# Patient Record
Sex: MALE | Race: WHITE | NOT HISPANIC OR LATINO | Employment: OTHER | URBAN - METROPOLITAN AREA
[De-identification: names, ages, dates, MRNs, and addresses within clinical notes are randomized per-mention and may not be internally consistent; named-entity substitution may affect disease eponyms.]

---

## 2019-10-16 ENCOUNTER — OFFICE VISIT (OUTPATIENT)
Dept: URGENT CARE | Facility: CLINIC | Age: 69
End: 2019-10-16
Payer: COMMERCIAL

## 2019-10-16 VITALS
DIASTOLIC BLOOD PRESSURE: 71 MMHG | SYSTOLIC BLOOD PRESSURE: 151 MMHG | WEIGHT: 251 LBS | OXYGEN SATURATION: 98 % | HEART RATE: 55 BPM | TEMPERATURE: 97 F | HEIGHT: 70 IN | BODY MASS INDEX: 35.93 KG/M2 | RESPIRATION RATE: 18 BRPM

## 2019-10-16 DIAGNOSIS — S60.562A INSECT BITE OF LEFT HAND, INITIAL ENCOUNTER: Primary | ICD-10-CM
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
## 2019-10-16 DIAGNOSIS — Z91.038 ALLERGIC REACTION TO INSECT BITE: ICD-10-CM

## 2019-10-16 PROCEDURE — 99213 OFFICE O/P EST LOW 20 MIN: CPT | Performed by: PHYSICIAN ASSISTANT

## 2019-10-16 RX ORDER — CHLORAL HYDRATE 500 MG
CAPSULE ORAL
COMMUNITY

## 2019-10-16 RX ORDER — AMINO ACIDS/MV,IRON,MIN
TABLET ORAL
COMMUNITY

## 2019-10-16 RX ORDER — LEVOTHYROXINE SODIUM 0.03 MG/1
TABLET ORAL
COMMUNITY
End: 2020-08-18

## 2019-10-16 RX ORDER — ATORVASTATIN CALCIUM 10 MG/1
TABLET, FILM COATED ORAL
COMMUNITY

## 2019-10-16 RX ORDER — NEBIVOLOL 5 MG/1
TABLET ORAL
COMMUNITY

## 2019-10-16 RX ORDER — VALSARTAN 160 MG/1
160 TABLET ORAL DAILY
COMMUNITY
End: 2020-08-18

## 2019-10-16 RX ORDER — METHYLPREDNISOLONE 4 MG/1
TABLET ORAL
Qty: 1 EACH | Refills: 0 | Status: SHIPPED | OUTPATIENT
Start: 2019-10-16 | End: 2020-08-18

## 2019-10-16 NOTE — PATIENT INSTRUCTIONS
Take steroid as directed with food and water  While on this medication do not take any NSAIDs including ibuprofen (Advil), naproxen (Aleve), etc   Avoid caffeinated beverages while taking this medication  Continue Benadryl taking as directed  Apply ice to your hand for 20-30 minutes at a time, as needed for swelling and discomfort  Rest and elevate your hand when possible  Avoid itching  Follow up with family doctor in 3-5 days  Proceed to the ER with any facial tongue or throat swelling, difficulty breathing, wheezing, nausea, vomiting, heart racing, or if symptoms worsen  General Allergic Reaction   WHAT YOU NEED TO KNOW:   An allergic reaction is your body's response to an allergen  Allergens include medicines, food, insect stings, animal dander, mold, latex, chemicals, and dust mites  Pollen from trees, grass, and weeds can also cause an allergic reaction  DISCHARGE INSTRUCTIONS:   Return to the emergency department if:   · You have a skin rash, hives, swelling, or itching that gets worse  · You have trouble breathing, shortness of breath, wheezing, or coughing  · Your throat tightens, or your lips or tongue swell  · You have trouble swallowing or speaking  · You have dizziness, lightheadedness, fainting, or confusion  · You have nausea, vomiting, diarrhea, or abdominal cramps  · You have chest pain or tightness  Contact your healthcare provider if:   · You have questions or concerns about your condition or care  Medicines:   · Medicines  may be given to relieve certain allergy symptoms such as itching, sneezing, and swelling  You may take them as a pill or use drops in your nose or eyes  Topical treatments may be given to put directly on your skin to help decrease itching or swelling  · Take your medicine as directed  Contact your healthcare provider if you think your medicine is not helping or if you have side effects  Tell him of her if you are allergic to any medicine  Keep a list of the medicines, vitamins, and herbs you take  Include the amounts, and when and why you take them  Bring the list or the pill bottles to follow-up visits  Carry your medicine list with you in case of an emergency  Follow up with your healthcare provider as directed:  Write down your questions so you remember to ask them during your visits  Self-care:   · Avoid the allergen  that you think may have caused your allergic reaction  · Use cold compresses  on your skin or eyes if they were affected by the allergic reaction  Cold compresses may help to soothe your skin or eyes  · Rinse your nasal passages  with a saline solution  Daily rinsing may help clear your nose of allergens  · Do not smoke  Your allergy symptoms may decrease if you are not around smoke  Nicotine and other chemicals in cigarettes and cigars can also cause lung damage  Ask your healthcare provider for information if you currently smoke and need help to quit  E-cigarettes or smokeless tobacco still contain nicotine  Talk to your healthcare provider before you use these products  © 2017 [facility] Information is for End User's use only and may not be sold, redistributed or otherwise used for commercial purposes  All illustrations and images included in CareNotes® are the copyrighted property of A D A M [employer] Inc  or [employer]  The above information is an  only  It is not intended as medical advice for individual conditions or treatments  Talk to your doctor, nurse or pharmacist before following any medical regimen to see if it is safe and effective for you

## 2019-10-16 NOTE — PROGRESS NOTES
[email or web address]        NAME: Carlos A Wills is a 81 y o  male  : 1950    MRN: [id number]  DATE: 2019  TIME: 5:47 PM    Assessment and Plan   Insect bite of left hand, initial encounter Terrell Chavez  XXXA]  1  Insect bite of left hand, initial encounter  methylPREDNISolone 4 MG tablet therapy pack   2  Allergic reaction to insect bite  methylPREDNISolone 4 MG tablet therapy pack     Patient Instructions     Take steroid as directed with food and water  While on this medication do not take any NSAIDs including ibuprofen (Advil), naproxen (Aleve), etc   Avoid caffeinated beverages while taking this medication  Continue Benadryl taking as directed  Apply ice to your hand for 20-30 minutes at a time, as needed for swelling and discomfort  Rest and elevate your hand when possible  Avoid itching  Follow up with family doctor in 3-5 days  Proceed to the ER with any facial tongue or throat swelling, difficulty breathing, wheezing, nausea, vomiting, heart racing, or if symptoms worsen  No signs of systemic response  S/sx of anaphylaxis reviewed  Notified that future reactions could worsen, and to take benadryl immediately with onset of these/to ER with anaphylaxis  The diagnosis, etiology, expected course of reaction, and treatment plan were reviewed  All questions answered  Precautions given  Patient verbalized understanding and agreement with the treatment plan  Chief Complaint     Chief Complaint   Patient presents with    Insect Bite     Bee sting L hand yesterday- experiencing itchiness/swelling  Taking benedryl for symptoms, no relief- last dose 1200 today  History of Present Illness   82 y/o male presenting with c/o bee sting of the left hand x one day  Pt states he was stung by a bee yesterday evening  States he was wearing work gloves at the time, when he felt a pinching and stinging of the mid dorsal hand   When he looked down he did not see any insects and no disruption of glove fabric  Upon removal noted some redness and swelling that persisted into the evening  This morning awoke with redness and swelling of the hand that has been increasing throughout the day  He has treated with Benadryl, one tablet x 1 dose yesterday evening and again today at noon  Notes a tingling and itching over the back of the hand, unchanged with Benadryl therapy, as well as tightness of the hand with making a fist  No numbness, tingling, weakness, throat swelling or closing, tongue or facial swelling, SOB, chest tightness, wheezing, N/V, CP, or palpitations  Denies prior reaction to insect stings but has been stung by bee's previously  Review of Systems   Review of Systems   Constitutional: Negative for appetite change, chills, diaphoresis, fatigue and fever  Respiratory: Negative for cough, chest tightness, shortness of breath and wheezing  Cardiovascular: Negative for chest pain and palpitations  Gastrointestinal: Negative for abdominal pain, diarrhea, nausea and vomiting  Musculoskeletal: Negative for arthralgias and myalgias  No other treatments tried  Skin: Positive for color change  Neurological: Negative for syncope, weakness, light-headedness, numbness and headaches           Current Medications       Current Outpatient Medications:     aspirin 81 MG tablet, Take by mouth, Disp: , Rfl:     atorvastatin (LIPITOR) 10 mg tablet, Take by mouth, Disp: , Rfl:     Bran Fiber 500 MG TABS, Take by mouth, Disp: , Rfl:     levothyroxine 25 mcg tablet, Take by mouth, Disp: , Rfl:     Multiple Vitamins-Minerals (OCUVITE EXTRA) TABS, Take by mouth, Disp: , Rfl:     nebivolol (BYSTOLIC) 5 mg tablet, Take by mouth, Disp: , Rfl:     Omega-3 Fatty Acids (FISH OIL) 1,000 mg, Take by mouth, Disp: , Rfl:     valsartan (DIOVAN) 160 mg tablet, Take 160 mg by mouth daily, Disp: , Rfl:     methylPREDNISolone 4 MG tablet therapy pack, Use as directed on package, Disp: 1 each, Rfl: 0    Current Allergies     Allergies as of 10/16/2019    (No Known Allergies)            The following portions of the patient's history were reviewed and updated as appropriate: allergies, current medications, past family history, past medical history, past social history, past surgical history and problem list      Past Medical History:   Diagnosis Date    Hypertension        History reviewed  No pertinent surgical history  History reviewed  No pertinent family history  Medications have been verified  Objective   /71 (BP Location: Right arm, Patient Position: Sitting, Cuff Size: Standard)   Pulse 55   Temp (!) 97 °F (36 1 °C) (Tympanic)   Resp 18   Ht 5' 10" (1 778 m)   Wt 114 kg (251 lb)   SpO2 98%   BMI 36 01 kg/m²        Physical Exam     Physical Exam   Constitutional: He is oriented to person, place, and time  Vital signs are normal  He appears well-developed and well-nourished  He is cooperative  He does not appear ill  No distress  HENT:   Head: Normocephalic and atraumatic  Mouth/Throat: Oropharynx is clear and moist and mucous membranes are normal  No oral lesions  No trismus in the jaw  No uvula swelling  Eyes: Conjunctivae and lids are normal  Right eye exhibits no chemosis, no discharge and no exudate  Left eye exhibits no chemosis, no discharge and no exudate  Right conjunctiva is not injected  Left conjunctiva is not injected  Neck: Trachea normal and phonation normal  Neck supple  No neck rigidity  No edema and no erythema present  Cardiovascular: Normal rate, regular rhythm and normal heart sounds  Exam reveals no gallop, no distant heart sounds and no friction rub  No murmur heard  Pulmonary/Chest: Effort normal and breath sounds normal  No stridor  No respiratory distress  He has no decreased breath sounds  He has no wheezes  He has no rhonchi  He has no rales  Abdominal: Soft  Bowel sounds are normal  He exhibits no distension and no mass   There is no tenderness  There is no rigidity, no rebound and no guarding  Neurological: He is alert and oriented to person, place, and time  He has normal strength  He is not disoriented  No cranial nerve deficit  He exhibits normal muscle tone  Coordination and gait normal    Skin: Skin is warm, dry and intact  He is not diaphoretic  No pallor  Edema and erythema overlying the dorsal left hand extending from the posterior wrist to the distal finger tips  There is a 1 centimeter area of deep erythema along the mid 2nd metacarpal  Area is firm, but NTTP  No disruption of skin integrity or FB  FROM of the wrist  LROM of digits secondary to swelling  Psychiatric: He has a normal mood and affect  His behavior is normal  Judgment and thought content normal    Nursing note and vitals reviewed

## 2020-01-16 ENCOUNTER — OFFICE VISIT (OUTPATIENT)
Dept: PULMONOLOGY | Facility: MEDICAL CENTER | Age: 70
End: 2020-01-16
Payer: COMMERCIAL

## 2020-01-16 VITALS
TEMPERATURE: 95.8 F | HEART RATE: 52 BPM | WEIGHT: 246 LBS | OXYGEN SATURATION: 97 % | BODY MASS INDEX: 35.22 KG/M2 | SYSTOLIC BLOOD PRESSURE: 104 MMHG | HEIGHT: 70 IN | DIASTOLIC BLOOD PRESSURE: 62 MMHG | RESPIRATION RATE: 12 BRPM

## 2020-01-16 DIAGNOSIS — G47.33 OSA (OBSTRUCTIVE SLEEP APNEA): Primary | ICD-10-CM

## 2020-01-16 DIAGNOSIS — E66.09 CLASS 2 OBESITY DUE TO EXCESS CALORIES WITHOUT SERIOUS COMORBIDITY WITH BODY MASS INDEX (BMI) OF 35.0 TO 35.9 IN ADULT: ICD-10-CM

## 2020-01-16 DIAGNOSIS — I10 BENIGN ESSENTIAL HYPERTENSION: ICD-10-CM

## 2020-01-16 PROBLEM — E66.9 CLASS 2 OBESITY IN ADULT: Status: ACTIVE | Noted: 2020-01-16

## 2020-01-16 PROBLEM — E66.812 CLASS 2 OBESITY IN ADULT: Status: ACTIVE | Noted: 2020-01-16

## 2020-01-16 PROCEDURE — 3078F DIAST BP <80 MM HG: CPT | Performed by: INTERNAL MEDICINE

## 2020-01-16 PROCEDURE — 99203 OFFICE O/P NEW LOW 30 MIN: CPT | Performed by: INTERNAL MEDICINE

## 2020-01-16 PROCEDURE — 3074F SYST BP LT 130 MM HG: CPT | Performed by: INTERNAL MEDICINE

## 2020-01-16 NOTE — ASSESSMENT & PLAN NOTE
Aguila Blanca has mild RYANNE diagnosed several years ago  His overall AHI on initial diagnostic study in 2016 was 5 6  He has been compliant and benefitting from using the CPAP which is set at 7 cm of water  He does use nasal mask interface  His weight has not changed since he was diagnosed in 2016  I did place order for new CPAP studies  He will continue on CPAP setting of 7  I did encourage him that if he could lose 30-40 lb he may have resolution of his RYANNE and not need CPAP therapy  I did place order to his [address] for new supplies    His CPAP does not have Wifi capability

## 2020-01-16 NOTE — PROGRESS NOTES
Assessment/Plan:       Problem List Items Addressed This Visit        Respiratory    RYANNE (obstructive sleep apnea) - Primary     Herb Bonner has mild RYANNE diagnosed several years ago  His overall AHI on initial diagnostic study in 2016 was 5 6  He has been compliant and benefitting from using the CPAP which is set at 7 cm of water  He does use nasal mask interface  His weight has not changed since he was diagnosed in 2016  I did place order for new CPAP studies  He will continue on CPAP setting of 7  I did encourage him that if he could lose 30-40 lb he may have resolution of his RYANNE and not need CPAP therapy  I did place order to his [address] for new supplies  His CPAP does not have Wifi capability           Relevant Orders    PAP DME Resupply/Reorder       Cardiovascular and Mediastinum    Benign essential hypertension     His hypertension has been controlled with his present medication  Other    Class 2 obesity in adult     He is 246 lb and he is weight this for the past few years  We did discuss weight loss  I did tell him that with a any significant weight loss of 30 lb or more he main have significant improvement is mild RYANNE not need CPAP                 Return in about 1 year (around 1/16/2021)  All questions are answered to the patient's satisfaction and understanding  He verbalizes understanding  He is encouraged to call with any further questions or concerns  Portions of the record may have been created with voice recognition software  Occasional wrong word or "sound a like" substitutions may have occurred due to the inherent limitations of voice recognition software  Read the chart carefully and recognize, using context, where substitutions have occurred  a    Electronically Signed by Lety Balderas DO    ______________________________________________________________________    Chief Complaint:   Chief Complaint   Patient presents with    Sleep Apnea     pt reports no issues with machine        Patient ID: Lonnie Resendez is a 78 y o  y o  male has a past medical history of Hypertension, Hypothyroidism, and Sleep apnea, obstructive  1/16/2020  Patient presents today for initial visit for RYANNE    HPI    Lonnie Resendez has history of mild obstructive sleep apnea diagnosed several years ago  I had seen patient past and last visit was October 14, 2016  He did have a diagnostic sleep study done in May of 2016 which showed overall AHI of 5 6 with no significant oxygen desaturation  He then had a CPAP titration study done on June 21, 2016 required CPAP pressure 7 to eliminate most of his obstructive events and reduced AHI to 0 4  Oxygen hector was 93% and average was 96% on the CPAP titration  His weight is the same as it was in 2016  He is 246 lb  He is using the CPAP every night and is not having any excessive daytime somnolence or nocturnal dyspnea  No morning headache  He does use a nasal mask interface and is comfortable with this  His medical supply company is [employer]  He denies any shortness of breath with his activities  He does have history of hypothyroidism and is on supplement and also has hypertension  He is scheduled for a follow-up visit with his cardiologist Dr Prakash Vargas  He has no known history of heart disease and will have routine cardiac testing done next week including likely stress test and echocardiogram       Review of Systems   Constitutional: Negative for activity change, appetite change and fatigue  HENT: Negative for congestion and rhinorrhea  Eyes: Negative for redness  Respiratory: Negative for cough and shortness of breath  Cardiovascular: Negative for chest pain and leg swelling  Gastrointestinal: Negative for abdominal pain  Endocrine: Negative for polydipsia and polyphagia  Genitourinary: Negative for hematuria  Musculoskeletal: Negative for myalgias  Neurological: Negative for dizziness     Psychiatric/Behavioral: Negative for decreased concentration  Social history: He reports that he has never smoked  He has never used smokeless tobacco  He reports that he drinks alcohol  He reports that he does not use drugs  Past surgical history:   Past Surgical History:   Procedure Laterality Date    APPENDECTOMY      INGUINAL HERNIA REPAIR Left     KNEE ARTHROSCOPY Bilateral     ROTATOR CUFF REPAIR Bilateral     TONSILLECTOMY       Family history:   Family History   Problem Relation Age of Onset    Diabetes Father     Hypertension Father          There is no immunization history on file for this patient  Current Outpatient Medications   Medication Sig Dispense Refill    aspirin 81 MG tablet Take by mouth      atorvastatin (LIPITOR) 10 mg tablet Take by mouth      Bran Fiber 500 MG TABS Take by mouth      levothyroxine 25 mcg tablet Take by mouth      Multiple Vitamins-Minerals (OCUVITE EXTRA) TABS Take by mouth      nebivolol (BYSTOLIC) 5 mg tablet Take by mouth      valsartan (DIOVAN) 160 mg tablet Take 160 mg by mouth daily      methylPREDNISolone 4 MG tablet therapy pack Use as directed on package (Patient not taking: Reported on 1/16/2020) 1 each 0    Omega-3 Fatty Acids (FISH OIL) 1,000 mg Take by mouth       No current facility-administered medications for this visit  Allergies: Patient has no known allergies  Objective:  Vitals:    01/16/20 0813   BP: 104/62   BP Location: Left arm   Patient Position: Sitting   Cuff Size: Standard   Pulse: (!) 52   Resp: 12   Temp: (!) 95 8 °F (35 4 °C)   TempSrc: Tympanic   SpO2: 97%   Weight: 112 kg (246 lb)   Height: 5' 10" (1 778 m)   Oxygen Therapy  SpO2: 97 %    Wt Readings from Last 3 Encounters:   01/16/20 112 kg (246 lb)   10/16/19 114 kg (251 lb)   10/14/16 112 kg (246 lb)     Body mass index is 35 3 kg/m²  Physical Exam   Constitutional: He is oriented to person, place, and time  He appears well-developed and well-nourished  No distress  Overweight    Alert no distress HENT:   Head: Normocephalic  Nose: Nose normal    Mouth/Throat: Oropharynx is clear and moist  No oropharyngeal exudate  Mallampati score is 3   Eyes: Pupils are equal, round, and reactive to light  Conjunctivae are normal    Neck: Neck supple  No JVD present  No tracheal deviation present  Cardiovascular: Normal rate, regular rhythm and normal heart sounds  Pulmonary/Chest: Effort normal    Lung sounds are clear   Abdominal: Soft  He exhibits no distension  There is no tenderness  There is no guarding  Musculoskeletal: He exhibits no edema  Lymphadenopathy:     He has no cervical adenopathy  Neurological: He is alert and oriented to person, place, and time  Skin: Skin is warm and dry  No rash noted  Psychiatric: He has a normal mood and affect   His behavior is normal  Thought content normal

## 2020-01-16 NOTE — ASSESSMENT & PLAN NOTE
He is 246 lb and he is weight this for the past few years  We did discuss weight loss    I did tell him that with a any significant weight loss of 30 lb or more he main have significant improvement is mild RYANNE not need CPAP

## 2020-08-18 ENCOUNTER — OFFICE VISIT (OUTPATIENT)
Dept: URGENT CARE | Facility: CLINIC | Age: 70
End: 2020-08-18
Payer: COMMERCIAL

## 2020-08-18 VITALS
HEIGHT: 70 IN | WEIGHT: 240.2 LBS | OXYGEN SATURATION: 100 % | TEMPERATURE: 97.2 F | DIASTOLIC BLOOD PRESSURE: 74 MMHG | HEART RATE: 52 BPM | SYSTOLIC BLOOD PRESSURE: 150 MMHG | RESPIRATION RATE: 18 BRPM | BODY MASS INDEX: 34.39 KG/M2

## 2020-08-18 DIAGNOSIS — S76.111A QUADRICEPS STRAIN, RIGHT, INITIAL ENCOUNTER: Primary | ICD-10-CM

## 2020-08-18 PROCEDURE — 99213 OFFICE O/P EST LOW 20 MIN: CPT | Performed by: PHYSICIAN ASSISTANT

## 2020-08-18 RX ORDER — LEVOTHYROXINE SODIUM 0.05 MG/1
TABLET ORAL DAILY
COMMUNITY
Start: 2020-06-10

## 2020-08-18 RX ORDER — LOSARTAN POTASSIUM 100 MG/1
TABLET ORAL DAILY
COMMUNITY
Start: 2020-07-28

## 2020-08-18 NOTE — PATIENT INSTRUCTIONS
Apply heat to your leg for 20-30 minutes at a time, 3-4 times daily  Perform stretching exercises 2-3 times daily as reviewed in office, following heat application  You may apply ice or gently massage your leg with a frozen water bottle following activity to reduce swelling and inflammation  Continue ibuprofen for discomfort relief  Follow up with your family doctor or an orthopedist in 3-5 days if symptoms persist  Proceed to the ER if symptoms worsen

## 2020-08-18 NOTE — PROGRESS NOTES
[facility]        NAME: Anthony Dickson is a 78 y o  male  : 1950    MRN: [id number]  DATE: 2020  TIME: 8:54 AM    Assessment and Plan   Quadriceps strain, right, initial encounter [P28 433C]  1  Quadriceps strain, right, initial encounter       Patient Instructions   Apply heat to your leg for 20-30 minutes at a time, 3-4 times daily  Perform stretching exercises 2-3 times daily as reviewed in office, following heat application  You may apply ice or gently massage your leg with a frozen water bottle following activity to reduce swelling and inflammation  Continue ibuprofen for discomfort relief  Follow up with your family doctor or an orthopedist in 3-5 days if symptoms persist  Proceed to the ER if symptoms worsen  Chief Complaint     Chief Complaint   Patient presents with    Leg Pain     Tripped over small gate Saturday evening - fell forward  Has continued occ  pain R thigh that occ  shoots to R hip  Used ice  Occ  limp  No bruising or swelling noted   Fall     History of Present Illness     53-year-old male presenting with c/o right leg pain x days  States he tripped over a 16 inch fence, falling forward onto hands and knees  Denies head or neck injury and denies LOC  Began with stiffness later in the day that gradually increased  States he feels a stiffness similar to that which he had previously from a football injury 50 years ago after being hit with helmet, "classified it as deep thigh bruise " Pain is described as a constant stiffness with intermittent tight pains  No bruising, redness, swelling, numbness, tingling, or weakness  Pain relieves some with walking  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  Has treated with Advil 800 mg tablet three times daily with relief of pain  Review of Systems   Review of Systems   Constitutional: Negative for chills, diaphoresis, fatigue and fever  Respiratory: Negative for cough      Cardiovascular: Negative for chest pain    Gastrointestinal: Negative for abdominal pain, nausea and vomiting  Musculoskeletal: Negative for arthralgias and myalgias  None other than as noted in HPI  Skin: Negative for color change, rash and wound  Neurological: Negative for light-headedness and headaches  Current Medicatio3 dans       Current Outpatient Medications:     aspirin 81 MG tablet, Take by mouth, Disp: , Rfl:     atorvastatin (LIPITOR) 10 mg tablet, Take by mouth, Disp: , Rfl:     Bran Fiber 500 MG TABS, Take by mouth, Disp: , Rfl:     levothyroxine 50 mcg tablet, daily, Disp: , Rfl:     losartan (COZAAR) 100 MG tablet, daily, Disp: , Rfl:     Multiple Vitamins-Minerals (OCUVITE EXTRA) TABS, Take by mouth, Disp: , Rfl:     nebivolol (BYSTOLIC) 5 mg tablet, Take by mouth, Disp: , Rfl:     Omega-3 Fatty Acids (FISH OIL) 1,000 mg, Take by mouth, Disp: , Rfl:     Current Allergies     Allergies as of 08/18/2020    (No Known Allergies)          The following portions of the patient's history were reviewed and updated as appropriate: allergies, current medications, past family history, past medical history, past social history, past surgical history and problem list      Past Medical History:   Diagnosis Date    Disease of thyroid gland     Hypertension     Hypothyroidism     Sleep apnea, obstructive        Past Surgical History:   Procedure Laterality Date    APPENDECTOMY      INGUINAL HERNIA REPAIR Left     KNEE ARTHROSCOPY Bilateral     ROTATOR CUFF REPAIR Bilateral     TONSILLECTOMY       Family History   Problem Relation Age of Onset    Diabetes Father     Hypertension Father      Medications have been verified  Objective   /74   Pulse (!) 52   Temp (!) 97 2 °F (36 2 °C)   Resp 18   Ht 5' 10" (1 778 m)   Wt 109 kg (240 lb 3 2 oz)   SpO2 100%   BMI 34 47 kg/m²      Physical Exam     Physical Exam  Vitals signs and nursing note reviewed     Constitutional:       General: He is not in acute distress  Appearance: He is well-developed  He is not ill-appearing or diaphoretic  HENT:      Head: Normocephalic and atraumatic  Eyes:      General: Lids are normal          Right eye: No discharge  Left eye: No discharge  Conjunctiva/sclera: Conjunctivae normal       Right eye: Right conjunctiva is not injected  No chemosis or exudate  Left eye: Left conjunctiva is not injected  No chemosis or exudate  Cardiovascular:      Rate and Rhythm: Normal rate and regular rhythm  Heart sounds: Normal heart sounds  Heart sounds not distant  Pulmonary:      Effort: Pulmonary effort is normal  No respiratory distress  Breath sounds: Normal breath sounds  No stridor  No decreased breath sounds, wheezing, rhonchi or rales  Musculoskeletal:      Right hip: Normal  He exhibits normal range of motion, normal strength, no tenderness, no bony tenderness, no swelling, no crepitus, no deformity and no laceration  Lumbar back: He exhibits tenderness (lateral right lumbar area)  He exhibits no bony tenderness, no swelling, no edema, no deformity, no laceration, no pain and no spasm  Right upper leg: He exhibits tenderness (right quadricep without spasm)  He exhibits no bony tenderness, no swelling, no edema, no deformity and no laceration  Comments: Pain elicited with right hip flexion  Skin:     General: Skin is warm and dry  Coloration: Skin is not pale  Findings: No erythema or rash  Neurological:      Mental Status: He is alert  He is not disoriented  Cranial Nerves: No cranial nerve deficit  Motor: No abnormal muscle tone  Coordination: Coordination normal       Gait: Gait normal    Psychiatric:         Behavior: Behavior normal  Behavior is cooperative  Thought Content:  Thought content normal          Judgment: Judgment normal

## 2020-09-02 ENCOUNTER — TELEPHONE (OUTPATIENT)
Dept: PULMONOLOGY | Facility: MEDICAL CENTER | Age: 70
End: 2020-09-02

## 2020-09-09 ENCOUNTER — OFFICE VISIT (OUTPATIENT)
Dept: PULMONOLOGY | Facility: MEDICAL CENTER | Age: 70
End: 2020-09-09
Payer: COMMERCIAL

## 2020-09-09 VITALS
WEIGHT: 234 LBS | HEIGHT: 70 IN | OXYGEN SATURATION: 99 % | HEART RATE: 50 BPM | BODY MASS INDEX: 33.5 KG/M2 | RESPIRATION RATE: 12 BRPM | SYSTOLIC BLOOD PRESSURE: 132 MMHG | TEMPERATURE: 97.8 F | DIASTOLIC BLOOD PRESSURE: 74 MMHG

## 2020-09-09 DIAGNOSIS — I10 BENIGN ESSENTIAL HYPERTENSION: ICD-10-CM

## 2020-09-09 DIAGNOSIS — G47.33 OSA (OBSTRUCTIVE SLEEP APNEA): Primary | ICD-10-CM

## 2020-09-09 PROCEDURE — 99213 OFFICE O/P EST LOW 20 MIN: CPT | Performed by: NURSE PRACTITIONER

## 2020-09-09 NOTE — PATIENT INSTRUCTIONS
Sleep Apnea   AMBULATORY CARE:   Sleep apnea  is also called obstructive sleep apnea  It is a condition that causes you to stop breathing for 10 seconds or more while you are sleeping  During normal sleep, your throat is kept open by muscles that let air pass through easily  Sleep apnea causes the muscles and tissues around your throat to relax and block air from passing through  Sleep apnea may happen many times while you are asleep  Common symptoms include the following:   · No signs of breathing for 10 seconds or more while you sleep    · Snoring loudly, snorting, gasping or choking while you sleep, and waking up suddenly because of these    · A hard time thinking, remembering things, or focusing on your tasks the following day    · Headache or nausea    · Bedwetting or waking up often during the night to urinate    · Feeling sleepy, slow, and tired during the day  Seek care immediately if:   · You have chest pain or trouble breathing  Contact your healthcare provider if:   · You feel tired or depressed  · You have trouble staying awake during the day  · You have trouble thinking clearly  · You have questions or concerns about your condition or care  Treatment for sleep apnea  includes using a continuous positive airway pressure (CPAP) machine to keep your airway open during sleep  A mask is placed over your nose and mouth, or just your nose  The mask is hooked to the CPAP machine that blows a gentle stream of air into the mask when you breathe  This helps keep your airway open so you can breathe more regularly  Extra oxygen may be given to you through the machine  You may be given a mouth device  It looks like a mouth guard or dental retainer and stops your tongue and mouth tissues from blocking your throat while you sleep  Surgery may be needed to remove extra tissues that block your mouth, throat, or nose  Manage sleep apnea:   · Do not smoke    Nicotine and other chemicals in cigarettes and cigars can cause lung damage  Ask your healthcare provider for information if you currently smoke and need help to quit  E-cigarettes or smokeless tobacco still contain nicotine  Talk to your healthcare provider before you use these products  · Do not drink alcohol or take sedative medicine before you go to sleep  Alcohol and sedatives can relax the muscles and tissues around your throat  This can block the airflow to your lungs  · Maintain a healthy weight  Excess tissue around your throat may restrict your breathing  Ask your healthcare provider for information if you need to lose weight  · Sleep on your side or use pillows designed to prevent sleep apnea  This prevents your tongue or other tissues from blocking your throat  You can also raise the head of your bed  Follow up with your healthcare provider as directed:  Write down your questions so you remember to ask them during your visits  © 2017 [employer] Information is for End User's use only and may not be sold, redistributed or otherwise used for commercial purposes  All illustrations and images included in CareNotes® are the copyrighted property of [employer]  or [employer]  The above information is an  only  It is not intended as medical advice for individual conditions or treatments  Talk to your doctor, nurse or pharmacist before following any medical regimen to see if it is safe and effective for you

## 2020-09-09 NOTE — PROGRESS NOTES
Assessment/Plan:     Problem List Items Addressed This Visit        Respiratory    RYANNE (obstructive sleep apnea) - Primary     Tonio Landers is a 78year old male with diagnosis of obstructive sleep apnea  Diagnosed in 2016 he had mild RYANNE  Apneic hypopnea index was 5 6  He continues to use an benefit from CPAP  His compliance data is reviewed for the last 30 days  He is 75% compliant with average usage time of 6 hours and 31 minutes his apneic hypopnea index is reduced to 0 7 his current CPAP pressure is 12 cm of water pressure  Philippe Contreras reports feeling refreshed in the morning  He uses the machine nearly every night  And is doing well              Cardiovascular and Mediastinum    Benign essential hypertension     Patient has a diagnosis of central hypertension  He is currently controlled  He sees his primary care doctor  He also follows with a cardiologist in [place], Dr Heather John  Return in about 1 year (around 9/9/2021)  All questions are answered to the patient's satisfaction and understanding  He verbalizes understanding  He is encouraged to call with any further questions or concerns  Portions of the record may have been created with voice recognition software  Occasional wrong word or "sound a like" substitutions may have occurred due to the inherent limitations of voice recognition software  Read the chart carefully and recognize, using context, where substitutions have occurred  Electronically Signed by HUGH Lockhart    ______________________________________________________________________    Chief Complaint:   Chief Complaint   Patient presents with    Sleep Apnea     Compiance       Patient ID: Tonio Landers is a 78 y o  y o  male has a past medical history of Disease of thyroid gland, Hypertension, Hypothyroidism, and Sleep apnea, obstructive  9/9/2020  Patient presents today for follow-up visit    HPI Tonio Landers is a 53-year-old male who has a diagnosis of obstructive sleep apnea he was diagnosed in 2016  Apneic hypopnea index was 5 6  He was last seen in the office January 16, 2020    Review of Systems   Constitutional: Negative  HENT: Negative  Eyes: Negative  Respiratory: Negative  Cardiovascular: Negative  Gastrointestinal: Negative  Endocrine: Negative  Genitourinary: Negative  Musculoskeletal: Negative  Allergic/Immunologic: Negative  Neurological: Negative  Hematological: Negative  Psychiatric/Behavioral: Negative  Smoking history: He reports that he has never smoked  He has never used smokeless tobacco     The following portions of the patient's history were reviewed and updated as appropriate: past family history, past medical history, past social history, past surgical history and problem list       There is no immunization history on file for this patient  Current Outpatient Medications   Medication Sig Dispense Refill    aspirin 81 MG tablet Take by mouth      atorvastatin (LIPITOR) 10 mg tablet Take by mouth      Bran Fiber 500 MG TABS Take by mouth      levothyroxine 50 mcg tablet daily      losartan (COZAAR) 100 MG tablet daily      Multiple Vitamins-Minerals (OCUVITE EXTRA) TABS Take by mouth      nebivolol (BYSTOLIC) 5 mg tablet Take by mouth      Omega-3 Fatty Acids (FISH OIL) 1,000 mg Take by mouth       No current facility-administered medications for this visit  Allergies: Patient has no known allergies  Objective:  Vitals:    09/09/20 0859   BP: 132/74   BP Location: Left arm   Patient Position: Sitting   Cuff Size: Standard   Pulse: (!) 50   Resp: 12   Temp: 97 8 °F (36 6 °C)   TempSrc: Temporal   SpO2: 99%   Weight: 106 kg (234 lb)   Height: 5' 10" (1 778 m)   Oxygen Therapy  SpO2: 99 %    Wt Readings from Last 3 Encounters:   09/09/20 106 kg (234 lb)   08/18/20 109 kg (240 lb 3 2 oz)   01/16/20 112 kg (246 lb)     Body mass index is 33 58 kg/m²  Physical Exam  Vitals signs reviewed     HENT: Head: Normocephalic and atraumatic  Nose: Nose normal       Mouth/Throat:      Mouth: Mucous membranes are dry  Eyes:      Pupils: Pupils are equal, round, and reactive to light  Neck:      Musculoskeletal: Normal range of motion  Cardiovascular:      Rate and Rhythm: Normal rate and regular rhythm  Pulses: Normal pulses  Pulmonary:      Effort: Pulmonary effort is normal       Breath sounds: Normal breath sounds  Abdominal:      General: Abdomen is flat  Musculoskeletal: Normal range of motion  Skin:     General: Skin is warm and dry  Capillary Refill: Capillary refill takes less than 2 seconds  Neurological:      General: No focal deficit present  Mental Status: He is oriented to person, place, and time  Psychiatric:         Mood and Affect: Mood normal          Behavior: Behavior normal          Lab Review:   No visits with results within 6 Month(s) from this visit  Latest known visit with results is:   No results found for any previous visit  Past Surgical History:   Procedure Laterality Date    APPENDECTOMY      INGUINAL HERNIA REPAIR Left     KNEE ARTHROSCOPY Bilateral     ROTATOR CUFF REPAIR Bilateral     TONSILLECTOMY          Family History   Problem Relation Age of Onset    Diabetes Father     Hypertension Father         Diagnostics:  I have personally reviewed pertinent reports  Office Spirometry Results:     ESS:    No results found

## 2020-09-09 NOTE — ASSESSMENT & PLAN NOTE
Brandon Hallman is a 78year old male with diagnosis of obstructive sleep apnea  Diagnosed in 2016 he had mild RYANNE  Apneic hypopnea index was 5 6  He continues to use an benefit from CPAP  His compliance data is reviewed for the last 30 days  He is 75% compliant with average usage time of 6 hours and 31 minutes his apneic hypopnea index is reduced to 0 7 his current CPAP pressure is 12 cm of water pressure  Dinorah Graham reports feeling refreshed in the morning  He uses the machine nearly every night    And is doing well

## 2020-09-09 NOTE — ASSESSMENT & PLAN NOTE
Patient has a diagnosis of central hypertension  He is currently controlled  He sees his primary care doctor  He also follows with a cardiologist in [facility], Dr Alfonso Gaspar

## 2020-09-22 DIAGNOSIS — G47.33 OSA (OBSTRUCTIVE SLEEP APNEA): Primary | ICD-10-CM

## 2021-03-01 ENCOUNTER — IMMUNIZATIONS (OUTPATIENT)
Dept: FAMILY MEDICINE CLINIC | Facility: HOSPITAL | Age: 71
End: 2021-03-01

## 2021-03-01 DIAGNOSIS — Z23 ENCOUNTER FOR IMMUNIZATION: Primary | ICD-10-CM

## 2021-03-01 PROCEDURE — 0001A SARS-COV-2 / COVID-19 MRNA VACCINE (PFIZER-BIONTECH) 30 MCG: CPT

## 2021-03-01 PROCEDURE — 91300 SARS-COV-2 / COVID-19 MRNA VACCINE (PFIZER-BIONTECH) 30 MCG: CPT

## 2021-03-22 ENCOUNTER — IMMUNIZATIONS (OUTPATIENT)
Dept: FAMILY MEDICINE CLINIC | Facility: HOSPITAL | Age: 71
End: 2021-03-22

## 2021-03-22 DIAGNOSIS — Z23 ENCOUNTER FOR IMMUNIZATION: Primary | ICD-10-CM

## 2021-03-22 PROCEDURE — 0002A SARS-COV-2 / COVID-19 MRNA VACCINE (PFIZER-BIONTECH) 30 MCG: CPT

## 2021-03-22 PROCEDURE — 91300 SARS-COV-2 / COVID-19 MRNA VACCINE (PFIZER-BIONTECH) 30 MCG: CPT

## 2021-09-08 ENCOUNTER — OFFICE VISIT (OUTPATIENT)
Dept: PULMONOLOGY | Facility: MEDICAL CENTER | Age: 71
End: 2021-09-08
Payer: COMMERCIAL

## 2021-09-08 VITALS
BODY MASS INDEX: 33.36 KG/M2 | HEIGHT: 70 IN | WEIGHT: 233 LBS | TEMPERATURE: 98.6 F | RESPIRATION RATE: 12 BRPM | DIASTOLIC BLOOD PRESSURE: 72 MMHG | HEART RATE: 60 BPM | OXYGEN SATURATION: 96 % | SYSTOLIC BLOOD PRESSURE: 128 MMHG

## 2021-09-08 DIAGNOSIS — E66.09 CLASS 1 OBESITY DUE TO EXCESS CALORIES WITHOUT SERIOUS COMORBIDITY WITH BODY MASS INDEX (BMI) OF 33.0 TO 33.9 IN ADULT: ICD-10-CM

## 2021-09-08 DIAGNOSIS — K04.7 TOOTH INFECTION: ICD-10-CM

## 2021-09-08 DIAGNOSIS — G47.33 OSA (OBSTRUCTIVE SLEEP APNEA): Primary | ICD-10-CM

## 2021-09-08 DIAGNOSIS — I10 BENIGN ESSENTIAL HYPERTENSION: ICD-10-CM

## 2021-09-08 PROCEDURE — 99214 OFFICE O/P EST MOD 30 MIN: CPT | Performed by: INTERNAL MEDICINE

## 2021-09-08 RX ORDER — OXYCODONE AND ACETAMINOPHEN 10; 325 MG/1; MG/1
1 TABLET ORAL EVERY 6 HOURS PRN
COMMUNITY
Start: 2021-09-02

## 2021-09-08 RX ORDER — METHYLCELLULOSE 500 MG/1
TABLET ORAL
COMMUNITY

## 2021-09-08 RX ORDER — AMOXICILLIN AND CLAVULANATE POTASSIUM 875; 125 MG/1; MG/1
1 TABLET, FILM COATED ORAL EVERY 12 HOURS
COMMUNITY
Start: 2021-09-06

## 2021-09-08 RX ORDER — CLINDAMYCIN HYDROCHLORIDE 150 MG/1
150 CAPSULE ORAL EVERY 6 HOURS
COMMUNITY
Start: 2021-09-06

## 2021-09-08 NOTE — PROGRESS NOTES
Assessment/Plan        Problem List Items Addressed This Visit        Digestive    Tooth infection     He currently has infection of left lower molar which is causing him pain  He is on antibiotic therapy by status he was seen oral surgeon  Plan was to treat infection antibiotic therapy and then have root canal when the infection has subsided  Because of this pain he is not able to uses CPAP therapy but says will resume once the pain improves         Relevant Medications    amoxicillin-clavulanate (AUGMENTIN) 875-125 mg per tablet       Respiratory    RYANNE (obstructive sleep apnea) - Primary     Initial in-lab diagnostic sleep study done 05/19/2016 when he weighed 240 lb showed mild RYANNE  Overall AHI was 5 6 and this increased to 23 4 during REM sleep  Average O2 saturation was 94% with hector of 90%    He is due for new CPAP machine and is present 1 is on recall list   Initial machine was issued 08/24/2016  DME is [facility]    He has been compliant and benefiting from using CPAP    I will place order for auto CPAP machine set at 7-10 cm water  His current machine is set at 7 cm water  He does use nasal mask interface to goes underneath the nose  He will contact us after he gets his new machine         Relevant Orders    CPAP Auto New DME       Cardiovascular and Mediastinum    Benign essential hypertension     He does take Cozaar 100 mg daily and Bystolic 5 mg daily  His blood pressure has been controlled and blood pressure today is normal             Other    Class 1 obesity due to excess calories without serious comorbidity with body mass index (BMI) of 33 0 to 33 9 in adult     He is overweight  He has lost some weight since his initial diagnostic sleep study when he weighed 240 lb May of 2016  Today he weighs 233 lb  I did encourage him to try to exercise, diet and lose weight    If he can lose significant weight he may not even need CPAP therapy  Initial diagnostic study showed mild RYANNE  Follow-up of RYANNE      HPI    Carnella Hollow for follow-up of obstructive sleep apnea  He has been having problems with left infected molar  This is a posterior molar and  has been having lot of pain  Because of this it has been  difficult for him open his mouth he has not use his CPAP last few days  Also has some swelling on the left side of his face  Previously was using on regular basis and doing well with it  Not having excessive daytime somnolence  He is on CPAP set at 7 cm water and uses a nasal mask that goes underneath the nose  He is satisfied with his interface  He has an auto [employer] machine issued 08/24/2016  He did see Dr Edy Barone for his infected left tooth  He is on antibiotic therapy and pain medication and ultimately plan was to have root canal done once the infection improves  Still having significant pain  He did have right shoulder surgery in January this year by Dr Efraín Broussard and did have a staph infection afterwards requiring antibiotic therapy for 2 weeks     DME for his CPAP is [address]    Does have history of hypertension    Also with history of hyperlipidemia and hypothyroidism    Past Medical History:   Diagnosis Date    Disease of thyroid gland     Hypertension     Hypothyroidism     Sleep apnea, obstructive        Past Surgical History:   Procedure Laterality Date    APPENDECTOMY      INGUINAL HERNIA REPAIR Left     KNEE ARTHROSCOPY Bilateral     ROTATOR CUFF REPAIR Bilateral     TONSILLECTOMY           Current Outpatient Medications:     amoxicillin-clavulanate (AUGMENTIN) 875-125 mg per tablet, 1 tablet every 12 (twelve) hours, Disp: , Rfl:     aspirin 81 MG tablet, Take by mouth, Disp: , Rfl:     atorvastatin (LIPITOR) 10 mg tablet, Take by mouth, Disp: , Rfl:     Bran Fiber 500 MG TABS, Take by mouth, Disp: , Rfl:     Cholecalciferol 25 MCG (1000 UT) tablet, 125 MCG 1 TABLET DAILY, Disp: , Rfl:     levothyroxine 50 mcg tablet, daily, Disp: , Rfl:     losartan (COZAAR) 100 MG tablet, daily, Disp: , Rfl:     methylcellulose (Citrucel) 500 mg tablet, Take 2 tablets daily, Disp: , Rfl:     Multiple Vitamins-Minerals (OCUVITE EXTRA) TABS, Take by mouth, Disp: , Rfl:     nebivolol (BYSTOLIC) 5 mg tablet, Take by mouth, Disp: , Rfl:     Omega-3 Fatty Acids (FISH OIL) 1,000 mg, Take by mouth, Disp: , Rfl:     oxyCODONE-acetaminophen (PERCOCET)  mg per tablet, Take 1 tablet by mouth every 6 (six) hours as needed, Disp: , Rfl:     clindamycin (CLEOCIN) 150 mg capsule, Take 150 mg by mouth every 6 (six) hours (Patient not taking: Reported on 9/8/2021), Disp: , Rfl:     Allergies   Allergen Reactions    Sulfamethoxazole-Trimethoprim Swelling       Social History     Tobacco Use    Smoking status: Never Smoker    Smokeless tobacco: Never Used   Substance Use Topics    Alcohol use: Yes     Comment: social         Family History   Problem Relation Age of Onset    Diabetes Father     Hypertension Father        Review of Systems   Constitutional: Negative for chills, fever and unexpected weight change  HENT: Negative for congestion, rhinorrhea and sore throat  Has left to pain swelling of face secondary to this  Tooth is infected   Eyes: Negative for discharge and redness  Respiratory: Negative for shortness of breath  Cardiovascular: Negative for chest pain, palpitations and leg swelling  Gastrointestinal: Negative for abdominal distention, abdominal pain and nausea  Endocrine: Negative for polydipsia and polyphagia  Genitourinary: Negative for dysuria  Musculoskeletal: Negative for joint swelling and myalgias  Skin: Negative for rash  Neurological: Negative for light-headedness  Psychiatric/Behavioral: Negative for decreased concentration  Vitals:    09/08/21 0804   BP: 128/72   Pulse: 60   Resp: 12   Temp: 98 6 °F (37 °C)   SpO2: 96%           Physical Exam  Vitals reviewed     Constitutional: General: He is not in acute distress  Appearance: Normal appearance  He is well-developed  HENT:      Head: Normocephalic  Nose: Nose normal       Mouth/Throat:      Mouth: Mucous membranes are moist       Pharynx: Oropharynx is clear  No oropharyngeal exudate  Eyes:      Conjunctiva/sclera: Conjunctivae normal       Pupils: Pupils are equal, round, and reactive to light  Cardiovascular:      Rate and Rhythm: Normal rate and regular rhythm  Heart sounds: Normal heart sounds  Pulmonary:      Effort: Pulmonary effort is normal       Comments: Lung sounds are clear  No wheezes, crackles or rhonchi  Abdominal:      General: There is no distension  Palpations: Abdomen is soft  Tenderness: There is no abdominal tenderness  Musculoskeletal:      Cervical back: Neck supple  Comments: No edema, cyanosis or clubbing   Lymphadenopathy:      Cervical: No cervical adenopathy  Skin:     General: Skin is warm and dry  Neurological:      Mental Status: He is alert and oriented to person, place, and time  Psychiatric:         Mood and Affect: Mood normal          Behavior: Behavior normal          Thought Content:  Thought content normal

## 2021-09-25 PROBLEM — K04.7 TOOTH INFECTION: Status: ACTIVE | Noted: 2021-09-25

## 2021-09-25 PROBLEM — E66.09 CLASS 1 OBESITY DUE TO EXCESS CALORIES WITHOUT SERIOUS COMORBIDITY WITH BODY MASS INDEX (BMI) OF 33.0 TO 33.9 IN ADULT: Status: ACTIVE | Noted: 2020-01-16

## 2021-09-25 PROBLEM — E66.811 CLASS 1 OBESITY DUE TO EXCESS CALORIES WITHOUT SERIOUS COMORBIDITY WITH BODY MASS INDEX (BMI) OF 33.0 TO 33.9 IN ADULT: Status: ACTIVE | Noted: 2020-01-16

## 2021-09-25 NOTE — ASSESSMENT & PLAN NOTE
He does take Cozaar 100 mg daily and Bystolic 5 mg daily    His blood pressure has been controlled and blood pressure today is normal

## 2021-09-25 NOTE — ASSESSMENT & PLAN NOTE
Initial in-lab diagnostic sleep study done 05/19/2016 when he weighed 240 lb showed mild RYANNE  Overall AHI was 5 6 and this increased to 23 4 during REM sleep  Average O2 saturation was 94% with hector of 90%    He is due for new CPAP machine and is present 1 is on recall list   Initial machine was issued 08/24/2016  DME is [facility]    He has been compliant and benefiting from using CPAP    I will place order for auto CPAP machine set at 7-10 cm water  His current machine is set at 7 cm water  He does use nasal mask interface to goes underneath the nose      He will contact us after he gets his new machine

## 2021-09-25 NOTE — ASSESSMENT & PLAN NOTE
He currently has infection of left lower molar which is causing him pain  He is on antibiotic therapy by status he was seen oral surgeon  Plan was to treat infection antibiotic therapy and then have root canal when the infection has subsided    Because of this pain he is not able to uses CPAP therapy but says will resume once the pain improves

## 2021-09-25 NOTE — ASSESSMENT & PLAN NOTE
He is overweight  He has lost some weight since his initial diagnostic sleep study when he weighed 240 lb May of 2016  Today he weighs 233 lb  I did encourage him to try to exercise, diet and lose weight    If he can lose significant weight he may not even need CPAP therapy  Initial diagnostic study showed mild RYANNE

## 2021-10-02 ENCOUNTER — IMMUNIZATIONS (OUTPATIENT)
Dept: FAMILY MEDICINE CLINIC | Facility: HOSPITAL | Age: 71
End: 2021-10-02

## 2021-10-02 DIAGNOSIS — Z23 ENCOUNTER FOR IMMUNIZATION: Primary | ICD-10-CM

## 2021-10-02 PROCEDURE — 0001A SARS-COV-2 / COVID-19 MRNA VACCINE (PFIZER-BIONTECH) 30 MCG: CPT

## 2021-10-02 PROCEDURE — 91300 SARS-COV-2 / COVID-19 MRNA VACCINE (PFIZER-BIONTECH) 30 MCG: CPT

## 2021-10-13 ENCOUNTER — TELEPHONE (OUTPATIENT)
Dept: PULMONOLOGY | Facility: CLINIC | Age: 71
End: 2021-10-13

## 2021-12-10 ENCOUNTER — VBI (OUTPATIENT)
Dept: ADMINISTRATIVE | Facility: OTHER | Age: 71
End: 2021-12-10

## 2023-07-10 ENCOUNTER — HOSPITAL ENCOUNTER (OUTPATIENT)
Dept: RADIOLOGY | Facility: HOSPITAL | Age: 73
Discharge: HOME | End: 2023-07-10
Attending: INTERNAL MEDICINE
Payer: COMMERCIAL

## 2023-07-10 DIAGNOSIS — R42 DIZZINESS AND GIDDINESS: ICD-10-CM

## 2023-07-10 DIAGNOSIS — J32.4 CHRONIC PANSINUSITIS: ICD-10-CM

## 2023-07-10 PROCEDURE — 70486 CT MAXILLOFACIAL W/O DYE: CPT

## 2023-07-10 PROCEDURE — G1004 CDSM NDSC: HCPCS

## 2023-07-10 PROCEDURE — 70450 CT HEAD/BRAIN W/O DYE: CPT

## 2024-02-21 ENCOUNTER — OFFICE VISIT (OUTPATIENT)
Dept: OBGYN CLINIC | Facility: CLINIC | Age: 74
End: 2024-02-21
Payer: COMMERCIAL

## 2024-02-21 ENCOUNTER — APPOINTMENT (OUTPATIENT)
Dept: RADIOLOGY | Facility: CLINIC | Age: 74
End: 2024-02-21
Payer: COMMERCIAL

## 2024-02-21 VITALS
WEIGHT: 239.8 LBS | HEIGHT: 69 IN | BODY MASS INDEX: 35.52 KG/M2 | SYSTOLIC BLOOD PRESSURE: 138 MMHG | DIASTOLIC BLOOD PRESSURE: 73 MMHG | HEART RATE: 61 BPM

## 2024-02-21 DIAGNOSIS — M70.62 TROCHANTERIC BURSITIS OF LEFT HIP: Primary | ICD-10-CM

## 2024-02-21 DIAGNOSIS — M25.552 LEFT HIP PAIN: ICD-10-CM

## 2024-02-21 DIAGNOSIS — M16.12 PRIMARY OSTEOARTHRITIS OF ONE HIP, LEFT: ICD-10-CM

## 2024-02-21 DIAGNOSIS — M76.32 IT BAND SYNDROME, LEFT: ICD-10-CM

## 2024-02-21 PROCEDURE — 73502 X-RAY EXAM HIP UNI 2-3 VIEWS: CPT

## 2024-02-21 PROCEDURE — 99204 OFFICE O/P NEW MOD 45 MIN: CPT | Performed by: ORTHOPAEDIC SURGERY

## 2024-02-21 NOTE — PROGRESS NOTES
Assessment/Plan:  1. Trochanteric bursitis of left hip  Ambulatory Referral to Physical Therapy      2. It band syndrome, left  Ambulatory Referral to Physical Therapy      3. Left hip pain  XR hip/pelv 2-3 vws left if performed    Ambulatory Referral to Physical Therapy      4. Primary osteoarthritis of one hip, left  Ambulatory Referral to Physical Therapy        Scribe Attestation      I,:  Chris Canales PA-C am acting as a scribe while in the presence of the attending physician.:       I,:  Aubrey Patel, DO personally performed the services described in this documentation    as scribed in my presence.:           Pan is a very pleasant 74-year-old gentleman presenting today for initial evaluation of left hip pain.  After reviewing his images, history, and physical exam, he does have mild age-appropriate osteoarthritis of the left hip.  However, this does not appear to be his major pain generator, as we were unable to reproduce any intra-articular symptoms on exam.  He is symptomatic of trochanteric bursitis and IT band syndrome.  We discussed this with him here today and treatment options.  We agreed to begin with conservative treatment in the form of physical therapy to stretch the IT band.  Additionally, we have recommended that he use Voltaren gel anywhere between 2-4 times per day on the affected area.  He may take Tylenol as well as needed.  If this does not resolve his symptoms, he can return in 6 to 8 weeks for reevaluation and consideration of a cortisone injection.  He expressed understanding all of his questions were addressed today    Subjective: Initial evaluation left hip pain    Patient ID: Pan Gaming is a 74 y.o. male presenting today for evaluation of his left hip.  He denies any history of injury or surgery to the hip.  He denies any history of dysplasia as a child.  Over the past few months, he has noted increasing discomfort in the lateral and anterior left hip.  It bothers  him while he is sleeping and first thing in the morning.  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  He has not tried any specific interventions.  He does note some difficulty putting on his shoes and socks in the morning, but does not have difficulty getting in and out of the car or going up and down stairs.  The pain is achy and is up to 10 out of 10 at its worst, but currently is 4-5 out of 10.  He does have a history of lumbar spine issues for which he sees a chiropractor.  He does not use any ambulatory assistive devices, is independent with all activities of daily living and enjoyment, and denies any paresthesias    Review of Systems   Constitutional: Negative.    HENT: Negative.     Eyes: Negative.    Respiratory: Negative.     Cardiovascular: Negative.    Gastrointestinal: Negative.    Endocrine: Negative.    Genitourinary: Negative.    Musculoskeletal:  Positive for arthralgias, joint swelling and myalgias.   Skin: Negative.    Allergic/Immunologic: Negative.    Neurological: Negative.    Hematological: Negative.    Psychiatric/Behavioral: Negative.           Past Medical History:   Diagnosis Date    Disease of thyroid gland     Dizziness     Ear problems     Hypertension     Hypothyroidism     Sleep apnea, obstructive     Sleep difficulties        Past Surgical History:   Procedure Laterality Date    APPENDECTOMY      INGUINAL HERNIA REPAIR Left     KNEE ARTHROSCOPY Bilateral     ROTATOR CUFF REPAIR Bilateral     TONSILLECTOMY         Family History   Problem Relation Age of Onset    Diabetes Father     Hypertension Father        Social History     Occupational History    Not on file   Tobacco Use    Smoking status: Never    Smokeless tobacco: Never   Substance and Sexual Activity    Alcohol use: Yes     Comment: social    Drug use: Never    Sexual activity: Not on file         Current Outpatient Medications:     aspirin 81 MG  tablet, Take by mouth, Disp: , Rfl:     atorvastatin (LIPITOR) 10 mg tablet, Take by mouth, Disp: , Rfl:     azelastine (ASTELIN) 0.1 % nasal spray, 1 spray into each nostril daily, Disp: 30 mL, Rfl: 6    betamethasone valerate (VALISONE) 0.1 % cream, Apply topically daily, Disp: 15 g, Rfl: 2    Bran Fiber 500 MG TABS, Take by mouth, Disp: , Rfl:     Cholecalciferol 25 MCG (1000 UT) tablet, 125 MCG 1 TABLET DAILY, Disp: , Rfl:     levothyroxine 75 mcg tablet, Take 75 mcg by mouth every morning, Disp: , Rfl:     losartan (COZAAR) 100 MG tablet, daily, Disp: , Rfl:     losartan-hydrochlorothiazide (HYZAAR) 100-12.5 MG per tablet, , Disp: , Rfl:     meclizine (ANTIVERT) 25 mg tablet, TAKE 1 TABLET (25 MG TOTAL) BY MOUTH 2 TIMES A DAY AS NEEDED FOR DIZZINESS, Disp: , Rfl:     metFORMIN (GLUCOPHAGE) 500 mg tablet, Take 500 mg by mouth daily, Disp: , Rfl:     methylcellulose (Citrucel) 500 mg tablet, Take 2 tablets daily, Disp: , Rfl:     Multiple Vitamins-Minerals (OCUVITE EXTRA) TABS, Take by mouth, Disp: , Rfl:     nebivolol (BYSTOLIC) 5 mg tablet, Take by mouth, Disp: , Rfl:     Omega-3 Fatty Acids (FISH OIL) 1,000 mg, Take by mouth, Disp: , Rfl:     Allergies   Allergen Reactions    Sulfamethoxazole-Trimethoprim Swelling       Objective:  Vitals:    02/21/24 0816   BP: 138/73   Pulse: 61       Body mass index is 35.41 kg/m².    Left Hip Exam     Tenderness   The patient is experiencing tenderness in the lateral and greater trochanter (IT band).    Range of Motion   Abduction:  35 normal   Adduction:  20 normal   Extension:  0 normal   Flexion:  120 normal   External rotation:  50 normal   Internal rotation: 20 normal     Muscle Strength   Abduction: 5/5   Adduction: 5/5   Flexion: 5/5     Tests   ALEXANDRA: negative  Miguel A: positive    Other   Erythema: absent  Scars: absent  Sensation: normal  Pulse: present    Comments:  Overhanging abdominal pannus  Negative ALEXANDRA Araujo FADIR TTP greater trochanteric bursa and  IT band  Thigh and calf soft and nontender  +SILT L2-S1  Ambulates with normal symmetrical gait without assistive device            Physical Exam  Vitals and nursing note reviewed.   Constitutional:       Appearance: Normal appearance. He is well-developed.      Comments: Body mass index is 35.41 kg/m².   HENT:      Head: Normocephalic and atraumatic.      Right Ear: External ear normal.      Left Ear: External ear normal.   Eyes:      Extraocular Movements: Extraocular movements intact.      Conjunctiva/sclera: Conjunctivae normal.   Cardiovascular:      Rate and Rhythm: Normal rate.      Pulses: Normal pulses.   Pulmonary:      Effort: Pulmonary effort is normal.   Abdominal:      Palpations: Abdomen is soft.   Musculoskeletal:      Cervical back: Normal range of motion.      Comments: See ortho exam   Skin:     General: Skin is warm and dry.   Neurological:      General: No focal deficit present.      Mental Status: He is alert and oriented to person, place, and time. Mental status is at baseline.   Psychiatric:         Mood and Affect: Mood normal.         Behavior: Behavior normal.         Thought Content: Thought content normal.         Judgment: Judgment normal.         I have personally reviewed pertinent films in PACS the x-rays taken today of his left hip.  There is mild joint space narrowing and early marginal osteophytosis, but no significant sclerosis or subchondral cyst formation.  There is no evidence of avascular necrosis, fracture, or lytic or blastic lesion.    This document was created using speech voice recognition software.   Grammatical errors, random word insertions, pronoun errors, and incomplete sentences are an occasional consequence of this system due to software limitations, ambient noise, and hardware issues.   Any formal questions or concerns about content, text, or information contained within the body of this dictation should be directly addressed to the provider for clarification.

## 2024-02-27 ENCOUNTER — EVALUATION (OUTPATIENT)
Dept: PHYSICAL THERAPY | Facility: CLINIC | Age: 74
End: 2024-02-27
Payer: COMMERCIAL

## 2024-02-27 DIAGNOSIS — M70.62 TROCHANTERIC BURSITIS OF LEFT HIP: Primary | ICD-10-CM

## 2024-02-27 DIAGNOSIS — M25.552 LEFT HIP PAIN: ICD-10-CM

## 2024-02-27 DIAGNOSIS — M16.12 PRIMARY OSTEOARTHRITIS OF ONE HIP, LEFT: ICD-10-CM

## 2024-02-27 DIAGNOSIS — M76.32 IT BAND SYNDROME, LEFT: ICD-10-CM

## 2024-02-27 PROCEDURE — 97161 PT EVAL LOW COMPLEX 20 MIN: CPT | Performed by: PHYSICAL THERAPIST

## 2024-02-27 NOTE — PROGRESS NOTES
PT Evaluation   Today's date: 2024  Patient name: Pan Gaming  : 1950  MRN: [id number]  Referring provider: Chris Canales*  Dx:   Encounter Diagnosis     ICD-10-CM    1. Trochanteric bursitis of left hip  M70.62 Ambulatory Referral to Physical Therapy      2. It band syndrome, left  M76.32 Ambulatory Referral to Physical Therapy      3. Left hip pain  M25.552 Ambulatory Referral to Physical Therapy      4. Primary osteoarthritis of one hip, left  M16.12 Ambulatory Referral to Physical Therapy            CASE SUMMARY:   Pan Gaming is a 74 y.o. year old male who presents to OPPT upon referral from ortho  secondary to c/o left hip pain dx left bursitis.  Pan reports onset of symptoms ~  2-3 months ago as a result of unknown origin.  Current symptom location includes left greater trochanter and radiates to lateral/anterior thigh  and patient describes  current symptoms as: sharp, achy.  Symptoms are : intermittent.  Pain level:  Current: 2/10 and with ADL's 2/10.  Symptoms improve with: getting out of bed, moving around, and worsen with lying on left side overnight.  Overall symptom progression at this time is improving.   Previous injury to this area: none. States he has center low back pain, which he has had for years: worsened in past 2 months, improves with getting up and moving, worse with sleeping, lying in bed  Previous treatment includes: chiropractor 1-2 x week  Diagnostic testing includes: xray.     PMHx includes: See chart for full details with medications, allergies, past family history, past medical history, social history, past surgical history and problem list. All topics were reviewed.      Functionally, at baseline, Pan is independent with all basic ADL's and  advanced ADL's.  Currently, Pan is limited in the following activities: as described above.      Pan is lives with his daughter in a 1 story home with 2 stairs to enter.   Recreational activities include: none  ".  Pan Gaming is  retired.     Patient goal(s) for physical therapy is: to get better    Concurrent Complaints:  Red flags present: neg     Reflexes:    L3 (Patellar Tendon): 2+   S1 (achilles tendon): 2+    Posture   Sitting:+ PPT   Standing: reduced lumbar lordosis    LLD:neg    Skin creases:enhanced on right     Shift:neg    Scoliosis: neg    Knees: mild genu varum on right         Gait: reduced arm swing on right   Assistive device: neg   Trunk movement: reduced   Stride length: wnl   Trendelenburg: neg   Foot drop: neg    Functional movements:   Squat: wnl (-) pain   SLS: left: 8:70 sec    Right : 5:72 sec    Transfers sit/stand: independent   bed mobility independent    Lumbar AROM:    Flexion: wnl LOM Produced stiffness in low back   Extension: moderate LOM \"feels good\"   Side bend: Right: minimal LOM + stiffness     Left: minimal LOM + stiffness    Rotation: right: wnl left wnl no symptoms     Repeated motions: BL: stiffness in low back  Standing flexion:    Effect: produced tightness in back of both legs NW,  produced tightness in left lateral thigh  NW   Standing extension:   Effect: produced tightness agustina LE x 1 , reduced tightness in low back  Prone extension:    Effect: prone lying: + stiffness center low back,     Myotomes: wnl agustina LE  Hip ER: Right: 4/5, left 4-/5       LE ROM  L hip flexion: wnl degrees R hip flexion: reduced 15% degrees   L hip extension: min LOM degrees R hip extension: min LOM degrees   L hip IR: reduced 30% degrees  R hip IR: reduced 30% degrees    L hip ER: wnl degrees  R hip ER: wnl degrees    L hip abduction: wnl degrees  R hip abduction: wnl degrees      Palpation: + TTP area of greater trochanter, + soft tissue restriction along IT band     Dermatomes: wnl to light touch agustina LE    TA facilitation: fair    Flexibility:  Hamstring: Right: poor Left: poor      FOTO score is 53% with a 78% prediction in function.       Assessment  Assessment details: Patient is a 74 y.o. Male " who presents to skilled outpatient PT for the diagnosis of   Encounter Diagnosis     ICD-10-CM    1. Trochanteric bursitis of left hip  M70.62 Ambulatory Referral to Physical Therapy      2. It band syndrome, left  M76.32 Ambulatory Referral to Physical Therapy      3. Left hip pain  M25.552 Ambulatory Referral to Physical Therapy      4. Primary osteoarthritis of one hip, left  M16.12 Ambulatory Referral to Physical Therapy      . Patient presents with tenderness in area of left greater trochanter, + restriction noted in left IT band, lumbar symptoms are present consistently with left hip symptoms. Lumbar symptoms improve with repeated extension and increase with repeated flexion which is consistent with posterior derangement. Patient will benefit from skilled PT to resolve symptoms and improve function.   Patient vocalized a good understanding of  PLOC and HEP issued. Pan would benefit from skilled physical therapy services to address the functional limitations and progress towards prior level of function, to meet stated goals,  and independence with home exercise program.  Prognosis for successful rehab outcome is good with consistent participation in therapy and carryover of HEP and PLOC.No further referral is necessary at this time based upon examination results. Patient education performed during today's session included: Hep and PLOC.     Impairments: Abnormal gait, Abnormal or restricted ROM, Activity intolerance, Impaired physical strength, Lacks appropriate HEP, Poor posture, and Pain with function  Understanding of Dx/Px/POC: Good  Prognosis: Good    STG  + Patient will report a 35% improvement in symptoms (2-3 weeks)   + Patient will be independent in basic HEP (2-3 weeks)  + Patient will demonstrate good posture with verbal cuing   (2-3 weeks)  + Patient will demonstrate an increase in range of motion  lumbar motions by 1/3  (2-3 weeks)  + Patient will report increased ease lying on left side during  sleep  due to a reduction in pain (2-3 weeks)      LTG:  + Patient will report a 65% improvement in symptoms (4-6 weeks)   + Patient will increase FOTO score by 10 points (8 sessions)   + Patient will be independent in comprehensive HEP (4-6 weeks)  + Patient will report no left LE symtpoms x 2 days  (4-6 weeks)    Plan  Plan details: HEP development, stretching as needed per objective findings, strengthening core/lower quadrant, A/AA/PROM lumbar/hip motions, joint mobilizations prn, posture education, STM/MI as needed to reduce muscle tension per objective findings, muscle reeducation per objective findings, dynamic stabilization, PLOC discussed and agreed upon with patient [facility] extension program    Patient would benefit from: Skilled PT  Planned therapy interventions: Abdominal trunk stabilization, HEP, Joint mobilization, Manual therapy, Neuromuscular re-education, Patient education, Postural training, Strengthening, Stretching, and Therapeutic exercises  Frequency: 2x/wk  Duration in weeks: 4-6 weeks  Plan of Care beginning date: 02/27/204  Plan of Care expiration date: 6 weeks - 4/9/2024  Treatment plan discussed with: Patient    Patient verbalized understanding of POC. Please contact me if you have any questions or recommendations. Thank you for the referral and the opportunity to share in Pan Gaming's care.          Eval/ Re-eval Auth #/ Referral # Total visits Start date  Expiration date Total active units  Total manual units  PT only or  PT+OT?   2/27/2024 4 units max, no auth, no visit limit                       Past Medical History:   Diagnosis Date    Disease of thyroid gland     Dizziness     Ear problems     Hypertension     Hypothyroidism     Sleep apnea, obstructive     Sleep difficulties                      Reeval:   Insurance :         Visits: 1 2 3 4 5   Manual: 2/27/2024       STM/MI/IASTM IT band        Joint mobs: left hip, lspine                                        Ther ex/NMR:         Manual stretching: hamstring, hip flexor, piriformis/glute, IT band        PROM left hip        Rec bike/  nustep        IT band stretch w/ SOS instruct       EIS instruct       Hermilo test stretch        hamstring stretch                        Prone lying        Prone press up instruct       Prone press up with exhale                Sit/stand with hip hinge                                                                                                Therapeutic Activity:         Reevaluation                Gait Training:                 HEP:                 Modalities        MH        ice        Total time:             Access Code: [email or web address]  URL: [email or web address]  Date: 02/27/2024  Prepared by: Isabelle Ramsay    Exercises  - Standing Lumbar Extension with Counter  - 3-4 x daily - 7 x weekly - 1-2 sets - 10 reps  - Prone Press Up  - 3 x daily - 7 x weekly - 1 sets - 10 reps  - Supine ITB Stretch with Strap  - 2 x daily - 7 x weekly - 1 sets - 5 reps - 10 sec  hold

## 2024-02-29 ENCOUNTER — OFFICE VISIT (OUTPATIENT)
Dept: PHYSICAL THERAPY | Facility: CLINIC | Age: 74
End: 2024-02-29
Payer: COMMERCIAL

## 2024-02-29 DIAGNOSIS — M76.32 IT BAND SYNDROME, LEFT: ICD-10-CM

## 2024-02-29 DIAGNOSIS — M25.552 LEFT HIP PAIN: ICD-10-CM

## 2024-02-29 DIAGNOSIS — M70.62 TROCHANTERIC BURSITIS OF LEFT HIP: Primary | ICD-10-CM

## 2024-02-29 PROCEDURE — 97140 MANUAL THERAPY 1/> REGIONS: CPT | Performed by: PHYSICAL THERAPIST

## 2024-02-29 PROCEDURE — 97110 THERAPEUTIC EXERCISES: CPT | Performed by: PHYSICAL THERAPIST

## 2024-02-29 NOTE — PROGRESS NOTES
"        Daily Note         Today's date: 2024  Patient name: Pan Gaming  : 1950  MRN: [id number]  Referring provider: Chris Canales*  Dx:   Encounter Diagnosis     ICD-10-CM    1. Trochanteric bursitis of left hip  M70.62       2. It band syndrome, left  M76.32       3. Left hip pain  M25.552           Subjective: Pan Gmaing reports no symptoms entering today. States he's symtpoms have improved at night. 75% improved  \"I even walked two miles\"         Objective: See treatment diary below    Assessment:   significant tension noted with PROM left hip as well as reduced flexibility of pelvis musculature. Patient was issued upgraded HEP in written form for self stretching. Patient needed only minimal cuing for proper form. Patient denied symptoms exiting clinic today.   Patient instructed to mainain self stretches particularly lumbar extension.        Plan:  continue to address mobility and flexibility as well as lumbar extension principle            Eval/ Re-eval Auth #/ Referral # Total visits Start date  Expiration date Total active units  Total manual units  PT only or  PT+OT?   2024 4 units max, no auth, no visit limit                       Past Medical History:   Diagnosis Date    Disease of thyroid gland     Dizziness     Ear problems     Hypertension     Hypothyroidism     Sleep apnea, obstructive     Sleep difficulties                      Reeval:   Insurance :         Visits: 1 2 3 4 5   Manual: 2024      STM/MI/IASTM IT band  IASTM with roll out stick IT band left       Joint mobs: left hip, lspine  --                                      Ther ex/NMR:        Manual stretching: hamstring, hip flexor, piriformis/glute, IT band  Performed hamstring. Piriformis manually agustina       PROM left hip  performed      Rec bike/  nustep        IT band stretch w/ SOS instruct 10 sec x 5        EIS instruct 10 x 2      Hermilo test stretch        hamstring stretch                  "       Prone lying        Prone press up instruct       Prone press up with exhale                Sit/stand with hip hinge                                                                                                Therapeutic Activity:         Reevaluation                Gait Training:                 HEP:                 Modalities        MH        ice        Total time:             Access Code: [email or web address]  URL: [email or web address]  Date: 02/27/2024  Prepared by: Isabelle Ramsay    Exercises  - Standing Lumbar Extension with Counter  - 3-4 x daily - 7 x weekly - 1-2 sets - 10 reps  - Prone Press Up  - 3 x daily - 7 x weekly - 1 sets - 10 reps  - Supine ITB Stretch with Strap  - 2 x daily - 7 x weekly - 1 sets - 5 reps - 10 sec  hold    Access Code: [email or web address]  URL: [email or web address]  Date: 02/29/2024  Prepared by: Isabelle Ramsay    Exercises  - Supine Hamstring Stretch with Strap  - 1 x daily - 7 x weekly - 1 sets - 5 reps - 10 sec  hold  - Supine Piriformis Stretch with Foot on Ground  - 1 x daily - 7 x weekly - 1 sets - 10 reps - 5 sec  hold  - Supine Figure 4 Piriformis Stretch  - 1 x daily - 7 x weekly - 1 sets - 10 reps - 5 sec  hold  - Supine Quadriceps Stretch with Strap on Table  - 1 x daily - 7 x weekly - 1 sets - 5 reps - 10 sec  hold

## 2024-03-05 ENCOUNTER — OFFICE VISIT (OUTPATIENT)
Dept: PHYSICAL THERAPY | Facility: CLINIC | Age: 74
End: 2024-03-05
Payer: COMMERCIAL

## 2024-03-05 DIAGNOSIS — M76.32 IT BAND SYNDROME, LEFT: ICD-10-CM

## 2024-03-05 DIAGNOSIS — M70.62 TROCHANTERIC BURSITIS OF LEFT HIP: Primary | ICD-10-CM

## 2024-03-05 DIAGNOSIS — M25.552 LEFT HIP PAIN: ICD-10-CM

## 2024-03-05 DIAGNOSIS — M16.12 PRIMARY OSTEOARTHRITIS OF ONE HIP, LEFT: ICD-10-CM

## 2024-03-05 PROCEDURE — 97110 THERAPEUTIC EXERCISES: CPT | Performed by: PHYSICAL THERAPIST

## 2024-03-05 PROCEDURE — 97140 MANUAL THERAPY 1/> REGIONS: CPT | Performed by: PHYSICAL THERAPIST

## 2024-03-05 NOTE — PROGRESS NOTES
Daily Note         Today's date: 3/5/2024  Patient name: Pan Gaming  : 1950  MRN: [id number]  Referring provider: Chris Canales*  Dx:   Encounter Diagnosis     ICD-10-CM    1. Trochanteric bursitis of left hip  M70.62       2. It band syndrome, left  M76.32       3. Left hip pain  M25.552       4. Primary osteoarthritis of one hip, left  M16.12           Subjective: Pan Gaming reports he no longer has had pain in low back or hip.         Objective: See treatment diary below    Assessment:  {ASSESSMENT:56756}. The goal of the current treatment is to address patient's functional limitations as well as objective findings.       Plan: {PLAN:41775}        Eval/ Re-eval Auth #/ Referral # Total visits Start date  Expiration date Total active units  Total manual units  PT only or  PT+OT?   2024 4 units max, no auth, no visit limit                       Past Medical History:   Diagnosis Date    Disease of thyroid gland     Dizziness     Ear problems     Hypertension     Hypothyroidism     Sleep apnea, obstructive     Sleep difficulties                      Reeval:   Insurance :    foto     Visits: 1 2 3 4 5   Manual: 2024 2/29/24 3/5/24     STM/MI/IASTM IT band  IASTM with roll out stick IT band left       Joint mobs: left hip, lspine  --                                      Ther ex/NMR:        Manual stretching: hamstring, hip flexor, piriformis/glute, IT band  Performed hamstring. Piriformis manually agustina       PROM left hip  performed      Rec bike/  nustep        IT band stretch w/ SOS instruct 10 sec x 5        EIS instruct 10 x 2      Hermilo test stretch        hamstring stretch                        Prone lying        Prone press up instruct       Prone press up with exhale                Sit/stand with hip hinge                                                                                                Therapeutic Activity:         Reevaluation                Gait  Training:                 HEP:                 Modalities        MH        ice        Total time:             Access Code: [email or web address]  URL: [email or web address]  Date: 02/27/2024  Prepared by: Isabelle Ramsay    Exercises  - Standing Lumbar Extension with Counter  - 3-4 x daily - 7 x weekly - 1-2 sets - 10 reps  - Prone Press Up  - 3 x daily - 7 x weekly - 1 sets - 10 reps  - Supine ITB Stretch with Strap  - 2 x daily - 7 x weekly - 1 sets - 5 reps - 10 sec  hold    Access Code: [email or web address]  URL: [email or web address]  Date: 02/29/2024  Prepared by: Isabelle Ramsay    Exercises  - Supine Hamstring Stretch with Strap  - 1 x daily - 7 x weekly - 1 sets - 5 reps - 10 sec  hold  - Supine Piriformis Stretch with Foot on Ground  - 1 x daily - 7 x weekly - 1 sets - 10 reps - 5 sec  hold  - Supine Figure 4 Piriformis Stretch  - 1 x daily - 7 x weekly - 1 sets - 10 reps - 5 sec  hold  - Supine Quadriceps Stretch with Strap on Table  - 1 x daily - 7 x weekly - 1 sets - 5 reps - 10 sec  hold

## 2024-03-05 NOTE — PROGRESS NOTES
Daily Note     Today's date: 3/5/2024  Patient name: Pan Gaming  : 1950  MRN: [id number]  Referring provider: Chris Canales*  Dx:   Encounter Diagnosis     ICD-10-CM    1. Trochanteric bursitis of left hip  M70.62       2. It band syndrome, left  M76.32       3. Left hip pain  M25.552       4. Primary osteoarthritis of one hip, left  M16.12                      Subjective: Pt is very pleased he is feeling much better and overall feels confident in his ability to self manage.       Objective: See treatment diary below. HEP reviewed      Assessment: Tolerated treatment well. Patient is pleased with his progress and feels he is comfortable to DC and manage with his HEP. His AROM and overall functional mobility has improved and pain is abolished. He has no limitations at this time. Pt demo good prognosis for return to PLOF.       Plan:  DC to I HEP     Eval/ Re-eval Auth #/ Referral # Total visits Start date  Expiration date Total active units  Total manual units  PT only or  PT+OT?   2024 4 units max, no auth, no visit limit                       Past Medical History:   Diagnosis Date    Disease of thyroid gland     Dizziness     Ear problems     Hypertension     Hypothyroidism     Sleep apnea, obstructive     Sleep difficulties                      Reeval:   Insurance :         Visits: 1 2 3 4 5   Manual: 2024 2/29/24 3/5/24     STM/MI/IASTM IT band  IASTM with roll out stick IT band left  Roll out stick ITB      Joint mobs: left hip, lspine  --                                      Ther ex/NMR:        Manual stretching: hamstring, hip flexor, piriformis/glute, IT band  Performed hamstring. Piriformis manually agustina  Performed manually bilaterally.      PROM left hip  performed      Rec bike/  nustep        IT band stretch w/ SOS instruct 10 sec x 5   Rev     EIS instruct 10 x 2 3x10     Hermilo test stretch        hamstring stretch                        Prone lying        Prone press up  instruct       Prone press up with exhale                Sit/stand with hip hinge                                                                                                Therapeutic Activity:         Reevaluation   Lifting techniques floor to waist lifting up to 28lbs             Gait Training:                 HEP:                 Modalities        MH        ice        Total time:             Access Code: [email or web address]  URL: [email or web address]  Date: 02/27/2024  Prepared by: Isabelle Ramsay    Exercises  - Standing Lumbar Extension with Counter  - 3-4 x daily - 7 x weekly - 1-2 sets - 10 reps  - Prone Press Up  - 3 x daily - 7 x weekly - 1 sets - 10 reps  - Supine ITB Stretch with Strap  - 2 x daily - 7 x weekly - 1 sets - 5 reps - 10 sec  hold    Access Code: [email or web address]  URL: [email or web address]  Date: 02/29/2024  Prepared by: Isabelle Ramsay    Exercises  - Supine Hamstring Stretch with Strap  - 1 x daily - 7 x weekly - 1 sets - 5 reps - 10 sec  hold  - Supine Piriformis Stretch with Foot on Ground  - 1 x daily - 7 x weekly - 1 sets - 10 reps - 5 sec  hold  - Supine Figure 4 Piriformis Stretch  - 1 x daily - 7 x weekly - 1 sets - 10 reps - 5 sec  hold  - Supine Quadriceps Stretch with Strap on Table  - 1 x daily - 7 x weekly - 1 sets - 5 reps - 10 sec  hold

## 2024-03-07 ENCOUNTER — APPOINTMENT (OUTPATIENT)
Dept: PHYSICAL THERAPY | Facility: CLINIC | Age: 74
End: 2024-03-07
Payer: COMMERCIAL

## 2024-03-12 ENCOUNTER — APPOINTMENT (OUTPATIENT)
Dept: PHYSICAL THERAPY | Facility: CLINIC | Age: 74
End: 2024-03-12
Payer: COMMERCIAL

## 2024-03-14 ENCOUNTER — APPOINTMENT (OUTPATIENT)
Dept: PHYSICAL THERAPY | Facility: CLINIC | Age: 74
End: 2024-03-14
Payer: COMMERCIAL

## 2024-03-20 ENCOUNTER — APPOINTMENT (OUTPATIENT)
Dept: PHYSICAL THERAPY | Facility: CLINIC | Age: 74
End: 2024-03-20
Payer: COMMERCIAL

## 2024-03-22 ENCOUNTER — APPOINTMENT (OUTPATIENT)
Dept: PHYSICAL THERAPY | Facility: CLINIC | Age: 74
End: 2024-03-22
Payer: COMMERCIAL

## 2024-03-26 ENCOUNTER — APPOINTMENT (OUTPATIENT)
Dept: PHYSICAL THERAPY | Facility: CLINIC | Age: 74
End: 2024-03-26
Payer: COMMERCIAL

## 2024-03-28 ENCOUNTER — APPOINTMENT (OUTPATIENT)
Dept: PHYSICAL THERAPY | Facility: CLINIC | Age: 74
End: 2024-03-28
Payer: COMMERCIAL

## 2024-04-17 ENCOUNTER — OFFICE VISIT (OUTPATIENT)
Dept: OBGYN CLINIC | Facility: CLINIC | Age: 74
End: 2024-04-17
Payer: COMMERCIAL

## 2024-04-17 VITALS
SYSTOLIC BLOOD PRESSURE: 132 MMHG | WEIGHT: 241 LBS | HEIGHT: 69 IN | HEART RATE: 62 BPM | DIASTOLIC BLOOD PRESSURE: 78 MMHG | BODY MASS INDEX: 35.7 KG/M2

## 2024-04-17 DIAGNOSIS — M76.32 IT BAND SYNDROME, LEFT: ICD-10-CM

## 2024-04-17 DIAGNOSIS — M89.9 BONE LESION: Primary | ICD-10-CM

## 2024-04-17 DIAGNOSIS — M70.62 TROCHANTERIC BURSITIS OF LEFT HIP: ICD-10-CM

## 2024-04-17 DIAGNOSIS — M25.552 LEFT HIP PAIN: ICD-10-CM

## 2024-04-17 PROCEDURE — 99214 OFFICE O/P EST MOD 30 MIN: CPT | Performed by: ORTHOPAEDIC SURGERY

## 2024-04-17 NOTE — PROGRESS NOTES
Assessment/Plan:  1. Bone lesion  MRI femur left w wo contrast      2. Trochanteric bursitis of left hip        3. It band syndrome, left        4. Left hip pain  MRI femur left w wo contrast        Scribe Attestation      I,:  Wilian Blair am acting as a scribe while in the presence of the attending physician.:       I,:  Aubrey Patel, DO personally performed the services described in this documentation    as scribed in my presence.:           Pan is a pleasant 74-year-old gentleman who returns today for follow-up evaluation of his left hip pain.  I am pleased with the significant improvement he has experienced after initiating physical therapy and using Voltaren gel.  We spent time today discussing his persistent thigh pain and I explained that there are bony lesions evident on his x-rays about the femoral shaft.  I have ordered an MRI with contrast to evaluate these lesions.  I will call him with the results of the study to further delineate his plan of care.  All of his questions and concerns were addressed today.    Subjective: Follow up evaluation for left hip pain    Patient ID: Pan Gaming is a 74 y.o. male who returns today for follow-up evaluation of his left hip pain.  At his last visit, he was referred to physical therapy for greater trochanteric bursitis.  At today's visit, he reports he did participate in physical therapy and has been discharged to home exercise program.  He has been compliant with this.  He has also been using Voltaren gel.  His lateral hip pain is nearly resolved.  He does note some occasional discomfort about his thigh.  This does not seem to be related to his level of activity.  He denies any recent injury or trauma.    Review of Systems   Constitutional:  Positive for activity change. Negative for chills, fever and unexpected weight change.   HENT:  Negative for hearing loss, nosebleeds and sore throat.    Eyes:  Negative for pain, redness and visual disturbance.    Respiratory:  Negative for cough, shortness of breath and wheezing.    Cardiovascular:  Negative for chest pain, palpitations and leg swelling.   Gastrointestinal:  Negative for abdominal pain, nausea and vomiting.   Endocrine: Negative for polyphagia and polyuria.   Genitourinary:  Negative for dysuria and hematuria.   Musculoskeletal:  Negative for arthralgias, joint swelling and myalgias.        See HPI   Skin:  Negative for rash and wound.   Neurological:  Negative for dizziness, numbness and headaches.   Psychiatric/Behavioral:  Negative for decreased concentration and suicidal ideas. The patient is not nervous/anxious.          Past Medical History:   Diagnosis Date    Disease of thyroid gland     Dizziness     Ear problems     Hypertension     Hypothyroidism     Sleep apnea, obstructive     Sleep difficulties        Past Surgical History:   Procedure Laterality Date    APPENDECTOMY      INGUINAL HERNIA REPAIR Left     KNEE ARTHROSCOPY Bilateral     ROTATOR CUFF REPAIR Bilateral     TONSILLECTOMY         Family History   Problem Relation Age of Onset    Diabetes Father     Hypertension Father        Social History     Occupational History    Not on file   Tobacco Use    Smoking status: Never    Smokeless tobacco: Never   Substance and Sexual Activity    Alcohol use: Yes     Comment: social    Drug use: Never    Sexual activity: Not on file         Current Outpatient Medications:     atorvastatin (LIPITOR) 10 mg tablet, Take by mouth, Disp: , Rfl:     levothyroxine 75 mcg tablet, Take 75 mcg by mouth every morning, Disp: , Rfl:     losartan-hydrochlorothiazide (HYZAAR) 100-12.5 MG per tablet, , Disp: , Rfl:     metFORMIN (GLUCOPHAGE) 500 mg tablet, Take 500 mg by mouth daily, Disp: , Rfl:     Multiple Vitamins-Minerals (MACUVITE EYE CARE PO), Take by mouth, Disp: , Rfl:     aspirin 81 MG tablet, Take by mouth (Patient not taking: Reported on 3/25/2024), Disp: , Rfl:     azelastine (ASTELIN) 0.1 % nasal spray,  1 spray into each nostril daily, Disp: 30 mL, Rfl: 6    betamethasone valerate (VALISONE) 0.1 % cream, Apply topically daily (Patient not taking: Reported on 3/25/2024), Disp: 15 g, Rfl: 2    Bran Fiber 500 MG TABS, Take by mouth (Patient not taking: Reported on 3/25/2024), Disp: , Rfl:     Cholecalciferol 25 MCG (1000 UT) tablet, 125 MCG 1 TABLET DAILY, Disp: , Rfl:     ipratropium (ATROVENT) 0.06 % nasal spray, 2 sprays into each nostril 4 (four) times a day as needed for rhinitis, Disp: 15 mL, Rfl: 6    losartan (COZAAR) 100 MG tablet, daily (Patient not taking: Reported on 3/25/2024), Disp: , Rfl:     meclizine (ANTIVERT) 25 mg tablet, TAKE 1 TABLET (25 MG TOTAL) BY MOUTH 2 TIMES A DAY AS NEEDED FOR DIZZINESS (Patient not taking: Reported on 3/25/2024), Disp: , Rfl:     methylcellulose (Citrucel) 500 mg tablet, Take 2 tablets daily (Patient not taking: Reported on 3/25/2024), Disp: , Rfl:     Multiple Vitamins-Minerals (OCUVITE EXTRA) TABS, Take by mouth (Patient not taking: Reported on 3/25/2024), Disp: , Rfl:     nebivolol (BYSTOLIC) 5 mg tablet, Take by mouth (Patient not taking: Reported on 3/25/2024), Disp: , Rfl:     Allergies   Allergen Reactions    Sulfamethoxazole-Trimethoprim Swelling       Objective:  Vitals:    04/17/24 0713   BP: 132/78   Pulse: 62       Body mass index is 35.59 kg/m².    Left Hip Exam     Tenderness   The patient is experiencing tenderness in the lateral and greater trochanter (IT band).    Range of Motion   Abduction:  35 normal   Adduction:  20 normal   Extension:  0 normal   Flexion:  120 normal   External rotation:  50 normal   Internal rotation: 20 normal     Muscle Strength   Abduction: 5/5   Adduction: 5/5   Flexion: 5/5     Tests   ALEXANDRA: negative  Miguel A: positive    Other   Erythema: absent  Scars: absent  Sensation: normal  Pulse: present    Comments:  Overhanging abdominal pannus  Negative Stinchfield            Physical Exam  Vitals and nursing note reviewed.    Constitutional:       Appearance: Normal appearance. He is well-developed.   HENT:      Head: Normocephalic and atraumatic.      Right Ear: External ear normal.      Left Ear: External ear normal.      Nose: Nose normal.   Eyes:      General: No scleral icterus.     Extraocular Movements: Extraocular movements intact.      Conjunctiva/sclera: Conjunctivae normal.   Cardiovascular:      Rate and Rhythm: Normal rate.   Pulmonary:      Effort: Pulmonary effort is normal. No respiratory distress.   Musculoskeletal:      Cervical back: Normal range of motion and neck supple.      Comments: See Ortho exam   Skin:     General: Skin is warm and dry.   Neurological:      General: No focal deficit present.      Mental Status: He is alert and oriented to person, place, and time.   Psychiatric:         Behavior: Behavior normal.           This document was created using speech voice recognition software.   Grammatical errors, random word insertions, pronoun errors, and incomplete sentences are an occasional consequence of this system due to software limitations, ambient noise, and hardware issues.   Any formal questions or concerns about content, text, or information contained within the body of this dictation should be directly addressed to the provider for clarification.

## 2024-04-22 ENCOUNTER — RA CDI HCC (OUTPATIENT)
Dept: OTHER | Facility: HOSPITAL | Age: 74
End: 2024-04-22

## 2024-04-29 ENCOUNTER — OFFICE VISIT (OUTPATIENT)
Dept: FAMILY MEDICINE CLINIC | Facility: CLINIC | Age: 74
End: 2024-04-29
Payer: COMMERCIAL

## 2024-04-29 ENCOUNTER — TELEPHONE (OUTPATIENT)
Dept: FAMILY MEDICINE CLINIC | Facility: CLINIC | Age: 74
End: 2024-04-29

## 2024-04-29 VITALS
BODY MASS INDEX: 36.4 KG/M2 | SYSTOLIC BLOOD PRESSURE: 132 MMHG | RESPIRATION RATE: 16 BRPM | WEIGHT: 240.2 LBS | HEIGHT: 68 IN | HEART RATE: 72 BPM | DIASTOLIC BLOOD PRESSURE: 80 MMHG | TEMPERATURE: 99.4 F

## 2024-04-29 DIAGNOSIS — E78.2 MIXED HYPERLIPIDEMIA: ICD-10-CM

## 2024-04-29 DIAGNOSIS — R73.03 PREDIABETES: ICD-10-CM

## 2024-04-29 DIAGNOSIS — E03.9 HYPOTHYROIDISM, UNSPECIFIED TYPE: ICD-10-CM

## 2024-04-29 DIAGNOSIS — E66.01 OBESITY, MORBID (HCC): ICD-10-CM

## 2024-04-29 DIAGNOSIS — G47.33 OSA ON CPAP: ICD-10-CM

## 2024-04-29 DIAGNOSIS — Z11.59 NEED FOR HEPATITIS C SCREENING TEST: ICD-10-CM

## 2024-04-29 DIAGNOSIS — I10 BENIGN ESSENTIAL HYPERTENSION: Primary | ICD-10-CM

## 2024-04-29 PROBLEM — Z98.890 S/P CARPAL TUNNEL RELEASE: Status: ACTIVE | Noted: 2021-01-22

## 2024-04-29 PROBLEM — H81.23 VESTIBULAR NEURONITIS OF BOTH EARS: Status: ACTIVE | Noted: 2024-03-06

## 2024-04-29 PROBLEM — J32.4 CHRONIC PANSINUSITIS: Status: ACTIVE | Noted: 2023-06-29

## 2024-04-29 PROBLEM — S46.011A TRAUMATIC TEAR OF RIGHT ROTATOR CUFF: Status: ACTIVE | Noted: 2021-01-14

## 2024-04-29 PROBLEM — M25.872 ANKLE IMPINGEMENT SYNDROME, LEFT: Status: ACTIVE | Noted: 2022-05-10

## 2024-04-29 PROBLEM — R42 VERTIGO: Status: ACTIVE | Noted: 2023-04-28

## 2024-04-29 PROBLEM — K04.7 TOOTH INFECTION: Status: RESOLVED | Noted: 2021-09-25 | Resolved: 2024-04-29

## 2024-04-29 PROCEDURE — 1101F PT FALLS ASSESS-DOCD LE1/YR: CPT | Performed by: FAMILY MEDICINE

## 2024-04-29 PROCEDURE — 1159F MED LIST DOCD IN RCRD: CPT | Performed by: FAMILY MEDICINE

## 2024-04-29 PROCEDURE — 99204 OFFICE O/P NEW MOD 45 MIN: CPT | Performed by: FAMILY MEDICINE

## 2024-04-29 PROCEDURE — 1160F RVW MEDS BY RX/DR IN RCRD: CPT | Performed by: FAMILY MEDICINE

## 2024-04-29 PROCEDURE — 3725F SCREEN DEPRESSION PERFORMED: CPT | Performed by: FAMILY MEDICINE

## 2024-04-29 PROCEDURE — 3288F FALL RISK ASSESSMENT DOCD: CPT | Performed by: FAMILY MEDICINE

## 2024-04-29 NOTE — TELEPHONE ENCOUNTER
[facility] and [facility] will be faxing patients immunization records, please keep an eye out in [place] for this

## 2024-04-29 NOTE — PROGRESS NOTES
Assessment/Plan:    1. Benign essential hypertension  Assessment & Plan:  Prt advised on sleep apnea and HTN.  Last adjustment iof CPAP was a year ago.     Orders:  -     CBC; Future    2. Hypothyroidism, unspecified type  -     CBC; Future  -     TSH, 3rd generation; Future    3. Mixed hyperlipidemia  -     CBC; Future  -     Comprehensive metabolic panel; Future  -     Lipid Panel with Direct LDL reflex; Future    4. Prediabetes  Assessment & Plan:  Pt advised to stop the metformin and see if the dizziness clears within two weeks    Orders:  -     CBC; Future  -     Hemoglobin A1C; Future    5. Need for hepatitis C screening test  -     CBC; Future  -     Hepatitis C antibody; Future    6. RYANNE on CPAP  Assessment & Plan:  Uses CPAP - gets around 4 hrs out iof the machine    Orders:  -     CBC; Future    7. Obesity, morbid (HCC)    Stop metformin - monitor dizziness  Get labs in 4-6 weeks  Follow up in 8 weeks        There are no Patient Instructions on file for this visit.    Return in about 8 weeks (around 6/24/2024) for follow up labs and AWV - 30 min.    Subjective:      Patient ID: Pan Gaming is a 74 y.o. male.    Chief Complaint   Patient presents with   • Establish Care   • Dizziness     Ongoing for a couple months   YC       Pt is here for the first time to establish  Pt states he has been taking his BP for a bit  States he was placed on metfoprmin and since then he started getting a little lightheaded.  States its random. In store taking with other people  Pt states he has been on the metformin since nov    Pt states he had labs that Dr Cortez dealt with about two months ago        The following portions of the patient's history were reviewed and updated as appropriate: allergies, current medications, past family history, past medical history, past social history, past surgical history and problem list.    Review of Systems   Constitutional:  Negative for activity change, appetite change, chills,  "diaphoresis, fatigue, fever and unexpected weight change.   HENT:  Negative for congestion, dental problem, ear pain, mouth sores, sinus pressure, sinus pain, sore throat and trouble swallowing.    Eyes:  Negative for photophobia, discharge and itching.   Respiratory:  Negative for apnea, chest tightness and shortness of breath.    Cardiovascular:  Negative for chest pain, palpitations and leg swelling.   Gastrointestinal:  Negative for abdominal distention, abdominal pain, blood in stool, nausea and vomiting.   Endocrine: Negative for cold intolerance, heat intolerance, polydipsia, polyphagia and polyuria.   Genitourinary:  Negative for difficulty urinating.   Musculoskeletal:  Negative for arthralgias.   Skin:  Negative for color change and wound.   Neurological:  Positive for dizziness. Negative for syncope, speech difficulty and headaches.   Hematological:  Negative for adenopathy.   Psychiatric/Behavioral:  Negative for agitation and behavioral problems.          Current Outpatient Medications   Medication Sig Dispense Refill   • atorvastatin (LIPITOR) 10 mg tablet Take by mouth     • ipratropium (ATROVENT) 0.06 % nasal spray 2 sprays into each nostril 4 (four) times a day as needed for rhinitis 15 mL 6   • levothyroxine 75 mcg tablet Take 75 mcg by mouth every morning     • losartan-hydrochlorothiazide (HYZAAR) 100-12.5 MG per tablet      • Multiple Vitamins-Minerals (MACUVITE EYE CARE PO) Take by mouth     • meclizine (ANTIVERT) 25 mg tablet TAKE 1 TABLET (25 MG TOTAL) BY MOUTH 2 TIMES A DAY AS NEEDED FOR DIZZINESS (Patient not taking: Reported on 3/25/2024)       No current facility-administered medications for this visit.       Objective:    /80   Pulse 72   Temp 99.4 °F (37.4 °C) (Tympanic)   Resp 16   Ht 5' 8.31\" (1.735 m)   Wt 109 kg (240 lb 3.2 oz)   BMI 36.19 kg/m²        Physical Exam  Vitals and nursing note reviewed.   Constitutional:       General: He is not in acute distress.     " Appearance: He is well-developed. He is not diaphoretic.   HENT:      Head: Normocephalic and atraumatic.      Right Ear: External ear normal.      Left Ear: External ear normal.      Nose: Nose normal.      Mouth/Throat:      Pharynx: No oropharyngeal exudate.   Eyes:      General: No scleral icterus.        Right eye: No discharge.         Left eye: No discharge.      Pupils: Pupils are equal, round, and reactive to light.   Neck:      Thyroid: No thyromegaly.   Cardiovascular:      Rate and Rhythm: Normal rate.      Heart sounds: Normal heart sounds. No murmur heard.  Pulmonary:      Effort: Pulmonary effort is normal. No respiratory distress.      Breath sounds: Normal breath sounds. No wheezing.   Abdominal:      General: Bowel sounds are normal. There is no distension.      Palpations: Abdomen is soft. There is no mass.      Tenderness: There is no abdominal tenderness. There is no guarding or rebound.   Musculoskeletal:         General: Normal range of motion.   Skin:     General: Skin is warm and dry.      Findings: No erythema or rash.   Neurological:      Mental Status: He is alert.      Coordination: Coordination normal.      Deep Tendon Reflexes: Reflexes normal.   Psychiatric:         Behavior: Behavior normal.                Frank Lombardi, DO

## 2024-05-09 ENCOUNTER — HOSPITAL ENCOUNTER (OUTPATIENT)
Dept: RADIOLOGY | Facility: HOSPITAL | Age: 74
Discharge: HOME | End: 2024-05-09
Attending: ORTHOPAEDIC SURGERY
Payer: COMMERCIAL

## 2024-05-09 DIAGNOSIS — M89.9 BONE LESION: ICD-10-CM

## 2024-05-09 DIAGNOSIS — M25.552 LEFT HIP PAIN: ICD-10-CM

## 2024-05-09 PROCEDURE — 73720 MRI LWR EXTREMITY W/O&W/DYE: CPT

## 2024-05-09 PROCEDURE — A9585 GADOBUTROL INJECTION: HCPCS | Performed by: ORTHOPAEDIC SURGERY

## 2024-05-09 RX ORDER — GADOBUTROL 604.72 MG/ML
11 INJECTION INTRAVENOUS
Status: COMPLETED | OUTPATIENT
Start: 2024-05-09 | End: 2024-05-09

## 2024-05-09 RX ADMIN — GADOBUTROL 11 ML: 604.72 INJECTION INTRAVENOUS at 08:59

## 2024-05-20 ENCOUNTER — TELEPHONE (OUTPATIENT)
Dept: OBGYN CLINIC | Facility: HOSPITAL | Age: 74
End: 2024-05-20

## 2024-05-20 NOTE — TELEPHONE ENCOUNTER
Amanda Oakley,  Please advise if the following patient can be forced onto the schedule:    Reason for appointment: MRI results     Requested doctor and/or location: Amanda. Preferably AM       Thank you.

## 2024-05-24 ENCOUNTER — OFFICE VISIT (OUTPATIENT)
Dept: OBGYN CLINIC | Facility: CLINIC | Age: 74
End: 2024-05-24
Payer: COMMERCIAL

## 2024-05-24 VITALS
DIASTOLIC BLOOD PRESSURE: 72 MMHG | HEART RATE: 73 BPM | BODY MASS INDEX: 37.07 KG/M2 | SYSTOLIC BLOOD PRESSURE: 152 MMHG | WEIGHT: 244.6 LBS | HEIGHT: 68 IN

## 2024-05-24 DIAGNOSIS — M76.892 ENTHESOPATHY OF LEFT HIP REGION: Primary | ICD-10-CM

## 2024-05-24 DIAGNOSIS — M79.652 LEFT THIGH PAIN: ICD-10-CM

## 2024-05-24 PROCEDURE — 99214 OFFICE O/P EST MOD 30 MIN: CPT | Performed by: ORTHOPAEDIC SURGERY

## 2024-05-24 NOTE — PROGRESS NOTES
Assessment/Plan:  1. Enthesopathy of left hip region        2. Left thigh pain          Scribe Attestation      I,:  Wilian Blair am acting as a scribe while in the presence of the attending physician.:       I,:  Aubrey Patel, DO personally performed the services described in this documentation    as scribed in my presence.:           Pan is a pleasant 74-year-old gentleman who returns today for follow-up evaluation and MRI review for his left thigh.  We spent time today reviewing his MRI which demonstrates cortical thickening consistent with enthesopathy about his left thigh.  I reassured him that these findings are benign and do not require intervention.  I encouraged him to begin using Aleve for 2 weeks beginning in 8 days after his current medical treatment to help calm his symptoms.  If he finds his pain persists, he will return to the office.  Otherwise, we will see him back as needed.    Subjective: Follow-up evaluation and MRI review for left thigh    Patient ID: Pan Gaming is a 74 y.o. male who returns today for follow-up evaluation and MRI review for his left thigh.  At today's visit, he reports persistent tightness in his thigh.  Occasionally, this is painful for him and can cause mild to moderate pain.  He does not find that this limits his activity.  He denies any new injury or trauma.    Review of Systems   Constitutional:  Positive for activity change. Negative for chills, fever and unexpected weight change.   HENT:  Negative for hearing loss, nosebleeds and sore throat.    Eyes:  Negative for pain, redness and visual disturbance.   Respiratory:  Negative for cough, shortness of breath and wheezing.    Cardiovascular:  Negative for chest pain, palpitations and leg swelling.   Gastrointestinal:  Negative for abdominal pain, nausea and vomiting.   Endocrine: Negative for polyphagia and polyuria.   Genitourinary:  Negative for dysuria and hematuria.   Musculoskeletal:  Positive for myalgias.  Negative for arthralgias and joint swelling.        See HPI   Skin:  Negative for rash and wound.   Neurological:  Negative for dizziness, numbness and headaches.   Psychiatric/Behavioral:  Negative for decreased concentration and suicidal ideas. The patient is not nervous/anxious.          Past Medical History:   Diagnosis Date    Disease of thyroid gland     Dizziness     Ear problems     Hypertension     Hypothyroidism     Sleep apnea, obstructive     Sleep difficulties        Past Surgical History:   Procedure Laterality Date    APPENDECTOMY      INGUINAL HERNIA REPAIR Left     KNEE ARTHROSCOPY Bilateral     ROTATOR CUFF REPAIR Bilateral     TONSILLECTOMY         Family History   Problem Relation Age of Onset    Diabetes Father     Hypertension Father        Social History     Occupational History    Not on file   Tobacco Use    Smoking status: Never     Passive exposure: Never    Smokeless tobacco: Never   Substance and Sexual Activity    Alcohol use: Yes     Comment: social    Drug use: Never    Sexual activity: Not on file         Current Outpatient Medications:     atorvastatin (LIPITOR) 10 mg tablet, Take by mouth, Disp: , Rfl:     ipratropium (ATROVENT) 0.06 % nasal spray, 2 sprays into each nostril 4 (four) times a day as needed for rhinitis, Disp: 15 mL, Rfl: 6    levothyroxine 75 mcg tablet, Take 75 mcg by mouth every morning, Disp: , Rfl:     losartan-hydrochlorothiazide (HYZAAR) 100-12.5 MG per tablet, , Disp: , Rfl:     Multiple Vitamins-Minerals (MACUVITE EYE CARE PO), Take by mouth, Disp: , Rfl:     meclizine (ANTIVERT) 25 mg tablet, TAKE 1 TABLET (25 MG TOTAL) BY MOUTH 2 TIMES A DAY AS NEEDED FOR DIZZINESS (Patient not taking: Reported on 3/25/2024), Disp: , Rfl:     Allergies   Allergen Reactions    Sulfamethoxazole-Trimethoprim Swelling     Bactrim        Objective:  Vitals:    05/24/24 0802   BP: 152/72   Pulse: 73       Body mass index is 36.85 kg/m².    Left Hip Exam     Range of Motion    Abduction:  35 normal   Adduction:  20 normal   Extension:  0 normal   Flexion:  120 normal   External rotation:  50 normal   Internal rotation: 20 normal     Muscle Strength   Abduction: 5/5   Adduction: 5/5   Flexion: 5/5     Tests   ALEXANDRA: negative    Other   Erythema: absent  Scars: absent  Sensation: normal  Pulse: present    Comments:  Tender mid thigh  Overhanging abdominal pannus  Negative Stinchfield  FADIR: negative            Physical Exam  Vitals and nursing note reviewed.   Constitutional:       Appearance: Normal appearance. He is well-developed.   HENT:      Head: Normocephalic and atraumatic.      Right Ear: External ear normal.      Left Ear: External ear normal.      Nose: Nose normal.   Eyes:      General: No scleral icterus.     Extraocular Movements: Extraocular movements intact.      Conjunctiva/sclera: Conjunctivae normal.   Cardiovascular:      Rate and Rhythm: Normal rate.   Pulmonary:      Effort: Pulmonary effort is normal. No respiratory distress.   Musculoskeletal:      Cervical back: Normal range of motion and neck supple.      Comments: See Ortho exam   Skin:     General: Skin is warm and dry.   Neurological:      General: No focal deficit present.      Mental Status: He is alert and oriented to person, place, and time.   Psychiatric:         Behavior: Behavior normal.         I have personally reviewed pertinent films in PACS.    MRI of the left femur obtained on 5/9/2024 reviewed demonstrating chronic periosteal thickening.    This document was created using speech voice recognition software.   Grammatical errors, random word insertions, pronoun errors, and incomplete sentences are an occasional consequence of this system due to software limitations, ambient noise, and hardware issues.   Any formal questions or concerns about content, text, or information contained within the body of this dictation should be directly addressed to the provider for clarification.

## 2024-06-19 LAB
ALBUMIN SERPL-MCNC: 4.6 G/DL (ref 3.8–4.8)
ALP SERPL-CCNC: 48 IU/L (ref 44–121)
ALT SERPL-CCNC: 31 IU/L (ref 0–44)
AST SERPL-CCNC: 29 IU/L (ref 0–40)
BASOPHILS # BLD AUTO: 0 X10E3/UL (ref 0–0.2)
BASOPHILS NFR BLD AUTO: 1 %
BILIRUB SERPL-MCNC: 1 MG/DL (ref 0–1.2)
BUN SERPL-MCNC: 19 MG/DL (ref 8–27)
BUN/CREAT SERPL: 24 (ref 10–24)
CALCIUM SERPL-MCNC: 9.7 MG/DL (ref 8.6–10.2)
CHLORIDE SERPL-SCNC: 100 MMOL/L (ref 96–106)
CHOLEST SERPL-MCNC: 165 MG/DL (ref 100–199)
CO2 SERPL-SCNC: 24 MMOL/L (ref 20–29)
CREAT SERPL-MCNC: 0.8 MG/DL (ref 0.76–1.27)
EGFR: 94 ML/MIN/1.73
EOSINOPHIL # BLD AUTO: 0.1 X10E3/UL (ref 0–0.4)
EOSINOPHIL NFR BLD AUTO: 2 %
ERYTHROCYTE [DISTWIDTH] IN BLOOD BY AUTOMATED COUNT: 13.1 % (ref 11.6–15.4)
GLOBULIN SER-MCNC: 2.1 G/DL (ref 1.5–4.5)
GLUCOSE SERPL-MCNC: 81 MG/DL (ref 70–99)
HBA1C MFR BLD: 5.8 % (ref 4.8–5.6)
HCT VFR BLD AUTO: 43.4 % (ref 37.5–51)
HCV AB S/CO SERPL IA: NON REACTIVE
HDLC SERPL-MCNC: 68 MG/DL
HGB BLD-MCNC: 14.5 G/DL (ref 13–17.7)
IMM GRANULOCYTES # BLD: 0 X10E3/UL (ref 0–0.1)
IMM GRANULOCYTES NFR BLD: 1 %
LDLC SERPL CALC-MCNC: 79 MG/DL (ref 0–99)
LDLC/HDLC SERPL: 1.2 RATIO (ref 0–3.6)
LYMPHOCYTES # BLD AUTO: 1.8 X10E3/UL (ref 0.7–3.1)
LYMPHOCYTES NFR BLD AUTO: 29 %
MCH RBC QN AUTO: 31.8 PG (ref 26.6–33)
MCHC RBC AUTO-ENTMCNC: 33.4 G/DL (ref 31.5–35.7)
MCV RBC AUTO: 95 FL (ref 79–97)
MICRODELETION SYND BLD/T FISH: NORMAL
MONOCYTES # BLD AUTO: 0.5 X10E3/UL (ref 0.1–0.9)
MONOCYTES NFR BLD AUTO: 9 %
NEUTROPHILS # BLD AUTO: 3.7 X10E3/UL (ref 1.4–7)
NEUTROPHILS NFR BLD AUTO: 58 %
PLATELET # BLD AUTO: 191 X10E3/UL (ref 150–450)
POTASSIUM SERPL-SCNC: 4 MMOL/L (ref 3.5–5.2)
PROT SERPL-MCNC: 6.7 G/DL (ref 6–8.5)
RBC # BLD AUTO: 4.56 X10E6/UL (ref 4.14–5.8)
SL AMB VLDL CHOLESTEROL CALC: 18 MG/DL (ref 5–40)
SODIUM SERPL-SCNC: 138 MMOL/L (ref 134–144)
TRIGL SERPL-MCNC: 103 MG/DL (ref 0–149)
TSH SERPL DL<=0.005 MIU/L-ACNC: 4.18 UIU/ML (ref 0.45–4.5)
WBC # BLD AUTO: 6.2 X10E3/UL (ref 3.4–10.8)

## 2024-06-20 ENCOUNTER — RA CDI HCC (OUTPATIENT)
Dept: OTHER | Facility: HOSPITAL | Age: 74
End: 2024-06-20

## 2024-06-25 ENCOUNTER — TELEPHONE (OUTPATIENT)
Age: 74
End: 2024-06-25

## 2024-06-25 NOTE — TELEPHONE ENCOUNTER
CLM on VM for pt to return call to get more info.  According to last ov, pt was to return for ov if symptoms worsen. Await CB.

## 2024-06-25 NOTE — TELEPHONE ENCOUNTER
Caller: Patient    Doctor: Amanda    Reason for call: Pan is having a lot of left hip/thigh pain. Stated he can barley walk. Asking what he can take for the pain or should he come into the office to be evaluated. No available appts with Dr. Patel this week. Please advise.    Call back#: [phone]

## 2024-06-26 ENCOUNTER — OFFICE VISIT (OUTPATIENT)
Dept: OBGYN CLINIC | Facility: CLINIC | Age: 74
End: 2024-06-26
Payer: COMMERCIAL

## 2024-06-26 VITALS
HEIGHT: 68 IN | DIASTOLIC BLOOD PRESSURE: 73 MMHG | SYSTOLIC BLOOD PRESSURE: 130 MMHG | WEIGHT: 242.4 LBS | HEART RATE: 58 BPM | BODY MASS INDEX: 36.74 KG/M2

## 2024-06-26 DIAGNOSIS — M76.892 ENTHESOPATHY OF LEFT HIP REGION: ICD-10-CM

## 2024-06-26 DIAGNOSIS — M16.12 PRIMARY OSTEOARTHRITIS OF LEFT HIP: Primary | ICD-10-CM

## 2024-06-26 DIAGNOSIS — M79.652 LEFT THIGH PAIN: ICD-10-CM

## 2024-06-26 PROCEDURE — 99214 OFFICE O/P EST MOD 30 MIN: CPT | Performed by: ORTHOPAEDIC SURGERY

## 2024-06-26 NOTE — PROGRESS NOTES
Assessment/Plan:  1. Primary osteoarthritis of left hip        2. Enthesopathy of left hip region        3. Left thigh pain          Scribe Attestation      I,:  Wilian Blair am acting as a scribe while in the presence of the attending physician.:       I,:  Aubrey Patel, DO personally performed the services described in this documentation    as scribed in my presence.:           Pan is a pleasant 74-year-old gentleman who returns today for follow-up evaluation of his left hip and thigh pain.  He does have multiple underlying comorbidities that could be contributing to his symptoms.  As such, I recommended a diagnostic and fully therapeutic intra-articular left hip corticosteroid injection.  Will be performed by our spine and pain team and they will help to schedule this for him.  He understands to keep a pain diary for 2 weeks following the injection for review at his next visit.  We will see him back 2 weeks following his injection to discuss his response to treatment.  All of his questions and concerns were addressed today.    Subjective: Follow-up evaluation for left hip and thigh pain    Patient ID: Pan Gaming is a 74 y.o. male who returns today for follow-up evaluation of his left hip and thigh pain.  At today's visit, he reports an increase in pain earlier this week.  He describes sharp and severe pain about the anterolateral hip and more mild thigh pain.  His pain was severe enough that he had to use a walking stick for ambulation earlier in the week.  He denies any new injury or trauma.    Review of Systems   Constitutional:  Positive for activity change. Negative for chills, fever and unexpected weight change.   HENT:  Negative for hearing loss, nosebleeds and sore throat.    Eyes:  Negative for pain, redness and visual disturbance.   Respiratory:  Negative for cough, shortness of breath and wheezing.    Cardiovascular:  Negative for chest pain, palpitations and leg swelling.   Gastrointestinal:   Negative for abdominal pain, nausea and vomiting.   Endocrine: Negative for polyphagia and polyuria.   Genitourinary:  Negative for dysuria and hematuria.   Musculoskeletal:  Positive for arthralgias and myalgias. Negative for joint swelling.        See HPI   Skin:  Negative for rash and wound.   Neurological:  Negative for dizziness, numbness and headaches.   Psychiatric/Behavioral:  Negative for decreased concentration and suicidal ideas. The patient is not nervous/anxious.          Past Medical History:   Diagnosis Date    Disease of thyroid gland     Dizziness     Ear problems     Hypertension     Hypothyroidism     Sleep apnea, obstructive     Sleep difficulties        Past Surgical History:   Procedure Laterality Date    APPENDECTOMY      INGUINAL HERNIA REPAIR Left     KNEE ARTHROSCOPY Bilateral     ROTATOR CUFF REPAIR Bilateral     TONSILLECTOMY         Family History   Problem Relation Age of Onset    Diabetes Father     Hypertension Father        Social History     Occupational History    Not on file   Tobacco Use    Smoking status: Never     Passive exposure: Never    Smokeless tobacco: Never   Substance and Sexual Activity    Alcohol use: Yes     Comment: social    Drug use: Never    Sexual activity: Not on file         Current Outpatient Medications:     atorvastatin (LIPITOR) 10 mg tablet, Take by mouth, Disp: , Rfl:     ipratropium (ATROVENT) 0.06 % nasal spray, 2 sprays into each nostril 4 (four) times a day as needed for rhinitis, Disp: 15 mL, Rfl: 6    levothyroxine 75 mcg tablet, Take 75 mcg by mouth every morning, Disp: , Rfl:     losartan-hydrochlorothiazide (HYZAAR) 100-12.5 MG per tablet, , Disp: , Rfl:     metFORMIN (GLUCOPHAGE) 500 mg tablet, , Disp: , Rfl:     Multiple Vitamins-Minerals (MACUVITE EYE CARE PO), Take by mouth, Disp: , Rfl:     meclizine (ANTIVERT) 25 mg tablet, TAKE 1 TABLET (25 MG TOTAL) BY MOUTH 2 TIMES A DAY AS NEEDED FOR DIZZINESS (Patient not taking: Reported on  3/25/2024), Disp: , Rfl:     Allergies   Allergen Reactions    Sulfamethoxazole-Trimethoprim Swelling     Bactrim        Objective:  Vitals:    06/26/24 1525   BP: 130/73   Pulse: 58       Body mass index is 36.52 kg/m².    Left Hip Exam     Tenderness   The patient is experiencing tenderness in the anterior.    Range of Motion   Abduction:  35 normal   Adduction:  20 normal   Extension:  0 normal   Flexion:  120 normal   External rotation:  50 normal   Internal rotation: 20 normal     Muscle Strength   Abduction: 5/5   Adduction: 5/5   Flexion: 5/5     Tests   ALEXANDRA: negative    Other   Erythema: absent  Scars: absent  Sensation: normal  Pulse: present    Comments:  Tender mid thigh  Overhanging abdominal pannus  Negative Stinchfield  FADIR: negative            Physical Exam  Vitals and nursing note reviewed.   Constitutional:       Appearance: Normal appearance. He is well-developed.   HENT:      Head: Normocephalic and atraumatic.      Right Ear: External ear normal.      Left Ear: External ear normal.      Nose: Nose normal.   Eyes:      General: No scleral icterus.     Extraocular Movements: Extraocular movements intact.      Conjunctiva/sclera: Conjunctivae normal.   Cardiovascular:      Rate and Rhythm: Normal rate.   Pulmonary:      Effort: Pulmonary effort is normal. No respiratory distress.   Musculoskeletal:      Cervical back: Normal range of motion and neck supple.      Comments: See Ortho exam   Skin:     General: Skin is warm and dry.   Neurological:      General: No focal deficit present.      Mental Status: He is alert and oriented to person, place, and time.   Psychiatric:         Behavior: Behavior normal.         I have personally reviewed pertinent films in PACS.    MRI of the left femur obtained on 5/9/2024 reviewed again demonstrating chronic periosteal thickening.     This document was created using speech voice recognition software.   Grammatical errors, random word insertions, pronoun  errors, and incomplete sentences are an occasional consequence of this system due to software limitations, ambient noise, and hardware issues.   Any formal questions or concerns about content, text, or information contained within the body of this dictation should be directly addressed to the provider for clarification.

## 2024-06-27 ENCOUNTER — TELEPHONE (OUTPATIENT)
Dept: PAIN MEDICINE | Facility: CLINIC | Age: 74
End: 2024-06-27

## 2024-06-27 NOTE — TELEPHONE ENCOUNTER
Scheduled pt for left hip injection 07/19/24  Went over pre-procedure instructions below:  Nothing to eat or drink 1 hr prior to procedure  Need to arrange transportation  Proper clothing for procedure  No vaccines 2 weeks prior or after procedure  If ill or placed on antibiotics please call to reschedule

## 2024-06-27 NOTE — TELEPHONE ENCOUNTER
----- Message from Wilian Blair sent at 6/26/2024  3:40 PM EDT -----  Regarding: Left hip intra-articular injection  Please call this patient to schedule an image guided intra-articular left hip corticosteroid injection. Thank you!

## 2024-06-28 ENCOUNTER — OFFICE VISIT (OUTPATIENT)
Dept: FAMILY MEDICINE CLINIC | Facility: CLINIC | Age: 74
End: 2024-06-28
Payer: COMMERCIAL

## 2024-06-28 VITALS
WEIGHT: 243 LBS | SYSTOLIC BLOOD PRESSURE: 130 MMHG | HEIGHT: 68 IN | TEMPERATURE: 97.6 F | HEART RATE: 53 BPM | DIASTOLIC BLOOD PRESSURE: 70 MMHG | BODY MASS INDEX: 36.83 KG/M2 | OXYGEN SATURATION: 99 % | RESPIRATION RATE: 18 BRPM

## 2024-06-28 DIAGNOSIS — N40.0 BENIGN PROSTATIC HYPERPLASIA WITHOUT LOWER URINARY TRACT SYMPTOMS: ICD-10-CM

## 2024-06-28 DIAGNOSIS — Z00.00 MEDICARE ANNUAL WELLNESS VISIT, SUBSEQUENT: Primary | ICD-10-CM

## 2024-06-28 DIAGNOSIS — E03.9 HYPOTHYROIDISM, UNSPECIFIED TYPE: ICD-10-CM

## 2024-06-28 DIAGNOSIS — R73.03 PREDIABETES: ICD-10-CM

## 2024-06-28 DIAGNOSIS — Z13.820 SCREENING FOR OSTEOPOROSIS: ICD-10-CM

## 2024-06-28 DIAGNOSIS — I10 BENIGN ESSENTIAL HYPERTENSION: ICD-10-CM

## 2024-06-28 DIAGNOSIS — E78.2 MIXED HYPERLIPIDEMIA: ICD-10-CM

## 2024-06-28 PROCEDURE — G0439 PPPS, SUBSEQ VISIT: HCPCS | Performed by: FAMILY MEDICINE

## 2024-06-28 NOTE — PATIENT INSTRUCTIONS
Medicare Preventive Visit Patient Instructions  Thank you for completing your Welcome to Medicare Visit or Medicare Annual Wellness Visit today. Your next wellness visit will be due in one year (6/29/2025).  The screening/preventive services that you may require over the next 5-10 years are detailed below. Some tests may not apply to you based off risk factors and/or age. Screening tests ordered at today's visit but not completed yet may show as past due. Also, please note that scanned in results may not display below.  Preventive Screenings:  Service Recommendations Previous Testing/Comments   Colorectal Cancer Screening  Colonoscopy    Fecal Occult Blood Test (FOBT)/Fecal Immunochemical Test (FIT)  Fecal DNA/Cologuard Test  Flexible Sigmoidoscopy Age: 45-75 years old   Colonoscopy: every 10 years (May be performed more frequently if at higher risk)  OR  FOBT/FIT: every 1 year  OR  Cologuard: every 3 years  OR  Sigmoidoscopy: every 5 years  Screening may be recommended earlier than age 45 if at higher risk for colorectal cancer. Also, an individualized decision between you and your healthcare provider will decide whether screening between the ages of 76-85 would be appropriate. Colonoscopy: 01/15/2015  FOBT/FIT: Not on file  Cologuard: Not on file  Sigmoidoscopy: Not on file    Screening Current     Prostate Cancer Screening Individualized decision between patient and health care provider in men between ages of 55-69   Medicare will cover every 12 months beginning on the day after your 50th birthday PSA: No results in last 5 years           Hepatitis C Screening Once for adults born between 1945 and 1965  More frequently in patients at high risk for Hepatitis C Hep C Antibody: 06/18/2024    Screening Current   Diabetes Screening 1-2 times per year if you're at risk for diabetes or have pre-diabetes Fasting glucose: No results in last 5 years (No results in last 5 years)  A1C: 5.8 % (6/18/2024)  Screening Current    Cholesterol Screening Once every 5 years if you don't have a lipid disorder. May order more often based on risk factors. Lipid panel: 06/18/2024  Screening Not Indicated  History Lipid Disorder      Other Preventive Screenings Covered by Medicare:  Abdominal Aortic Aneurysm (AAA) Screening: covered once if your at risk. You're considered to be at risk if you have a family history of AAA or a male between the age of 65-75 who smoking at least 100 cigarettes in your lifetime.  Lung Cancer Screening: covers low dose CT scan once per year if you meet all of the following conditions: (1) Age 55-77; (2) No signs or symptoms of lung cancer; (3) Current smoker or have quit smoking within the last 15 years; (4) You have a tobacco smoking history of at least 20 pack years (packs per day x number of years you smoked); (5) You get a written order from a healthcare provider.  Glaucoma Screening: covered annually if you're considered high risk: (1) You have diabetes OR (2) Family history of glaucoma OR (3)  aged 50 and older OR (4)  American aged 65 and older  Osteoporosis Screening: covered every 2 years if you meet one of the following conditions: (1) Have a vertebral abnormality; (2) On glucocorticoid therapy for more than 3 months; (3) Have primary hyperparathyroidism; (4) On osteoporosis medications and need to assess response to drug therapy.  HIV Screening: covered annually if you're between the age of 15-65. Also covered annually if you are younger than 15 and older than 65 with risk factors for HIV infection. For pregnant patients, it is covered up to 3 times per pregnancy.    Immunizations:  Immunization Recommendations   Influenza Vaccine Annual influenza vaccination during flu season is recommended for all persons aged >= 6 months who do not have contraindications   Pneumococcal Vaccine   * Pneumococcal conjugate vaccine = PCV13 (Prevnar 13), PCV15 (Vaxneuvance), PCV20 (Prevnar 20)  *  Pneumococcal polysaccharide vaccine = PPSV23 (Pneumovax) Adults 19-65 yo with certain risk factors or if 65+ yo  If never received any pneumonia vaccine: recommend Prevnar 20 (PCV20)  Give PCV20 if previously received 1 dose of PCV13 or PPSV23   Hepatitis B Vaccine 3 dose series if at intermediate or high risk (ex: diabetes, end stage renal disease, liver disease)   Respiratory syncytial virus (RSV) Vaccine - COVERED BY MEDICARE PART D  * RSVPreF3 (Arexvy) CDC recommends that adults 60 years of age and older may receive a single dose of RSV vaccine using shared clinical decision-making (SCDM)   Tetanus (Td) Vaccine - COST NOT COVERED BY MEDICARE PART B Following completion of primary series, a booster dose should be given every 10 years to maintain immunity against tetanus. Td may also be given as tetanus wound prophylaxis.   Tdap Vaccine - COST NOT COVERED BY MEDICARE PART B Recommended at least once for all adults. For pregnant patients, recommended with each pregnancy.   Shingles Vaccine (Shingrix) - COST NOT COVERED BY MEDICARE PART B  2 shot series recommended in those 19 years and older who have or will have weakened immune systems or those 50 years and older     Health Maintenance Due:      Topic Date Due   • Colorectal Cancer Screening  01/15/2025   • Hepatitis C Screening  Completed     Immunizations Due:      Topic Date Due   • Pneumococcal Vaccine: 65+ Years (1 of 1 - PCV) Never done   • Influenza Vaccine (Season Ended) 09/01/2024     Advance Directives   What are advance directives?  Advance directives are legal documents that state your wishes and plans for medical care. These plans are made ahead of time in case you lose your ability to make decisions for yourself. Advance directives can apply to any medical decision, such as the treatments you want, and if you want to donate organs.   What are the types of advance directives?  There are many types of advance directives, and each state has rules about  how to use them. You may choose a combination of any of the following:  Living will:  This is a written record of the treatment you want. You can also choose which treatments you do not want, which to limit, and which to stop at a certain time. This includes surgery, medicine, IV fluid, and tube feedings.   Durable power of  for healthcare (DPAHC):  This is a written record that states who you want to make healthcare choices for you when you are unable to make them for yourself. This person, called a proxy, is usually a family member or a friend. You may choose more than 1 proxy.  Do not resuscitate (DNR) order:  A DNR order is used in case your heart stops beating or you stop breathing. It is a request not to have certain forms of treatment, such as CPR. A DNR order may be included in other types of advance directives.  Medical directive:  This covers the care that you want if you are in a coma, near death, or unable to make decisions for yourself. You can list the treatments you want for each condition. Treatment may include pain medicine, surgery, blood transfusions, dialysis, IV or tube feedings, and a ventilator (breathing machine).  Values history:  This document has questions about your views, beliefs, and how you feel and think about life. This information can help others choose the care that you would choose.  Why are advance directives important?  An advance directive helps you control your care. Although spoken wishes may be used, it is better to have your wishes written down. Spoken wishes can be misunderstood, or not followed. Treatments may be given even if you do not want them. An advance directive may make it easier for your family to make difficult choices about your care.   Weight Management   Why it is important to manage your weight:  Being overweight increases your risk of health conditions such as heart disease, high blood pressure, type 2 diabetes, and certain types of cancer. It can  also increase your risk for osteoarthritis, sleep apnea, and other respiratory problems. Aim for a slow, steady weight loss. Even a small amount of weight loss can lower your risk of health problems.  How to lose weight safely:  A safe and healthy way to lose weight is to eat fewer calories and get regular exercise. You can lose up about 1 pound a week by decreasing the number of calories you eat by 500 calories each day.   Healthy meal plan for weight management:  A healthy meal plan includes a variety of foods, contains fewer calories, and helps you stay healthy. A healthy meal plan includes the following:  Eat whole-grain foods more often.  A healthy meal plan should contain fiber. Fiber is the part of grains, fruits, and vegetables that is not broken down by your body. Whole-grain foods are healthy and provide extra fiber in your diet. Some examples of whole-grain foods are whole-wheat breads and pastas, oatmeal, brown rice, and bulgur.  Eat a variety of vegetables every day.  Include dark, leafy greens such as spinach, kale, dipesh greens, and mustard greens. Eat yellow and orange vegetables such as carrots, sweet potatoes, and winter squash.   Eat a variety of fruits every day.  Choose fresh or canned fruit (canned in its own juice or light syrup) instead of juice. Fruit juice has very little or no fiber.  Eat low-fat dairy foods.  Drink fat-free (skim) milk or 1% milk. Eat fat-free yogurt and low-fat cottage cheese. Try low-fat cheeses such as mozzarella and other reduced-fat cheeses.  Choose meat and other protein foods that are low in fat.  Choose beans or other legumes such as split peas or lentils. Choose fish, skinless poultry (chicken or turkey), or lean cuts of red meat (beef or pork). Before you cook meat or poultry, cut off any visible fat.   Use less fat and oil.  Try baking foods instead of frying them. Add less fat, such as margarine, sour cream, regular salad dressing and mayonnaise to foods.  Eat fewer high-fat foods. Some examples of high-fat foods include french fries, doughnuts, ice cream, and cakes.  Eat fewer sweets.  Limit foods and drinks that are high in sugar. This includes candy, cookies, regular soda, and sweetened drinks.  Exercise:  Exercise at least 30 minutes per day on most days of the week. Some examples of exercise include walking, biking, dancing, and swimming. You can also fit in more physical activity by taking the stairs instead of the elevator or parking farther away from stores. Ask your healthcare provider about the best exercise plan for you.      © Copyright [employer] 2018 Information is for End User's use only and may not be sold, redistributed or otherwise used for commercial purposes. All illustrations and images included in CareNotes® are the copyrighted property of [employer] Inc[employer] or [employer]

## 2024-06-28 NOTE — PROGRESS NOTES
Ambulatory Visit  Name: Pan Gaming      : 1950      MRN: [id number]  Encounter Provider: Frank Lombardi, DO  Encounter Date: 2024   Encounter department: [facility]    Assessment & Plan   1. Medicare annual wellness visit, subsequent  2. Benign essential hypertension  Assessment & Plan:  stable  Orders:  -     CBC; Future  -     Comprehensive metabolic panel; Future; Expected date: 12/10/2024  -     Lipid Panel with Direct LDL reflex; Future; Expected date: 12/10/2024  -     TSH, 3rd generation; Future; Expected date: 12/10/2024  -     CBC  -     Comprehensive metabolic panel  -     Lipid Panel with Direct LDL reflex  -     TSH, 3rd generation  3. Mixed hyperlipidemia  -     CBC; Future  -     Comprehensive metabolic panel; Future; Expected date: 12/10/2024  -     Lipid Panel with Direct LDL reflex; Future; Expected date: 12/10/2024  -     TSH, 3rd generation; Future; Expected date: 12/10/2024  -     CBC  -     Comprehensive metabolic panel  -     Lipid Panel with Direct LDL reflex  -     TSH, 3rd generation  4. Hypothyroidism, unspecified type  -     CBC; Future  -     Comprehensive metabolic panel; Future; Expected date: 12/10/2024  -     Lipid Panel with Direct LDL reflex; Future; Expected date: 12/10/2024  -     TSH, 3rd generation; Future; Expected date: 12/10/2024  -     CBC  -     Comprehensive metabolic panel  -     Lipid Panel with Direct LDL reflex  -     TSH, 3rd generation  5. Prediabetes  -     CBC; Future  -     Comprehensive metabolic panel; Future; Expected date: 12/10/2024  -     Lipid Panel with Direct LDL reflex; Future; Expected date: 12/10/2024  -     TSH, 3rd generation; Future; Expected date: 12/10/2024  -     CBC  -     Comprehensive metabolic panel  -     Lipid Panel with Direct LDL reflex  -     TSH, 3rd generation  6. Screening for osteoporosis  7. Benign prostatic hyperplasia without lower urinary tract symptoms       Preventive health issues were discussed  with patient, and age appropriate screening tests were ordered as noted in patient's After Visit Summary. Personalized health advice and appropriate referrals for health education or preventive services given if needed, as noted in patient's After Visit Summary.    History of Present Illness     Pt is here for an AWV  Pt states he is feeling better - dizziness has improved  Pt states he is seeing DR Patel for his hip       Patient Care Team:  Frank Lombardi, DO as PCP - General (Family Medicine)  Dante Dash MD as Consulting Physician (Cardiology)  Radames Erwin MD as Consulting Physician (Urology)  Aubrey Patel DO as Consulting Physician (Orthopedic Surgery)  Hammad Vick MD as Consulting Physician (Otolaryngology)  [facility] as Consulting Physician (Optometry)    Review of Systems  Medical History Reviewed by provider this encounter:  Tobacco  Allergies  Meds  Problems  Med Hx  Surg Hx  Fam Hx       Annual Wellness Visit Questionnaire   Pan is here for his Subsequent Wellness visit. Last Medicare Wellness visit information reviewed, patient interviewed, no change since last AWV.     Health Risk Assessment:   Patient rates overall health as good. Patient feels that their physical health rating is slightly better. Patient is satisfied with their life. Eyesight was rated as same. Hearing was rated as same. Patient feels that their emotional and mental health rating is same. Patients states they are never, rarely angry. Patient states they are never, rarely unusually tired/fatigued. Pain experienced in the last 7 days has been some. Patient's pain rating has been 6/10. Patient states that he has experienced no weight loss or gain in last 6 months.     Depression Screening:   PHQ-2 Score: 0      Fall Risk Screening:   In the past year, patient has experienced: no history of falling in past year      Home Safety:  Patient has trouble with stairs inside or outside of their home. Patient has  working smoke alarms and has working carbon monoxide detector. Home safety hazards include: none.     Nutrition:   Current diet is Regular.     Medications:   Patient is not currently taking any over-the-counter supplements. Patient is able to manage medications.     Activities of Daily Living (ADLs)/Instrumental Activities of Daily Living (IADLs):   Walk and transfer into and out of bed and chair?: Yes  Dress and groom yourself?: Yes    Bathe or shower yourself?: Yes    Feed yourself? Yes  Do your laundry/housekeeping?: Yes  Manage your money, pay your bills and track your expenses?: Yes  Make your own meals?: Yes    Do your own shopping?: Yes    ADL comments: Daughter Diana helps when needed    Previous Hospitalizations:   Any hospitalizations or ED visits within the last 12 months?: No      Advance Care Planning:   Living will: Yes    Durable POA for healthcare: Yes    Advanced directive: Yes      Cognitive Screening:   Provider or family/friend/caregiver concerned regarding cognition?: No    PREVENTIVE SCREENINGS      Cardiovascular Screening:    General: Screening Not Indicated, History Lipid Disorder and Risks and Benefits Discussed    Due for: Lipid Panel      Diabetes Screening:     General: Screening Current and Risks and Benefits Discussed    Due for: Blood Glucose      Colorectal Cancer Screening:     General: Screening Current      Prostate Cancer Screening:    General: Risks and Benefits Discussed      Osteoporosis Screening:    General: Risks and Benefits Discussed and Patient Declines      Abdominal Aortic Aneurysm (AAA) Screening:    Risk factors include: age between 65-74 yo        General: Screening Not Indicated      Lung Cancer Screening:     General: Screening Not Indicated      Hepatitis C Screening:    General: Screening Current    Screening, Brief Intervention, and Referral to Treatment (SBIRT)    Screening  Typical number of drinks in a day: 0  Typical number of drinks in a week:  "0  Interpretation: Low risk drinking behavior.    Single Item Drug Screening:  How often have you used an illegal drug (including marijuana) or a prescription medication for non-medical reasons in the past year? never    Single Item Drug Screen Score: 0  Interpretation: Negative screen for possible drug use disorder    Brief Intervention  Alcohol & drug use screenings were reviewed. No concerns regarding substance use disorder identified.        No results found.    Objective     /70   Pulse (!) 53   Temp 97.6 °F (36.4 °C)   Resp 18   Ht 5' 8.3\" (1.735 m)   Wt 110 kg (243 lb)   SpO2 99%   BMI 36.62 kg/m²     Physical Exam  Administrative Statements           "

## 2024-07-19 ENCOUNTER — APPOINTMENT (OUTPATIENT)
Dept: RADIOLOGY | Facility: HOSPITAL | Age: 74
End: 2024-07-19
Payer: COMMERCIAL

## 2024-07-19 ENCOUNTER — HOSPITAL ENCOUNTER (OUTPATIENT)
Facility: AMBULARY SURGERY CENTER | Age: 74
Setting detail: OUTPATIENT SURGERY
Discharge: HOME | End: 2024-07-19
Attending: STUDENT IN AN ORGANIZED HEALTH CARE EDUCATION/TRAINING PROGRAM | Admitting: STUDENT IN AN ORGANIZED HEALTH CARE EDUCATION/TRAINING PROGRAM
Payer: COMMERCIAL

## 2024-07-19 VITALS
HEART RATE: 59 BPM | SYSTOLIC BLOOD PRESSURE: 164 MMHG | RESPIRATION RATE: 16 BRPM | OXYGEN SATURATION: 96 % | DIASTOLIC BLOOD PRESSURE: 70 MMHG | TEMPERATURE: 97.4 F

## 2024-07-19 PROBLEM — M16.12 OSTEOARTHRITIS OF LEFT HIP: Status: ACTIVE | Noted: 2024-07-19

## 2024-07-19 LAB — GLUCOSE SERPL-MCNC: 109 MG/DL (ref 65–140)

## 2024-07-19 PROCEDURE — 77002 NEEDLE LOCALIZATION BY XRAY: CPT | Performed by: STUDENT IN AN ORGANIZED HEALTH CARE EDUCATION/TRAINING PROGRAM

## 2024-07-19 PROCEDURE — 77002 NEEDLE LOCALIZATION BY XRAY: CPT

## 2024-07-19 PROCEDURE — NC001 PR NO CHARGE: Performed by: STUDENT IN AN ORGANIZED HEALTH CARE EDUCATION/TRAINING PROGRAM

## 2024-07-19 PROCEDURE — 20610 DRAIN/INJ JOINT/BURSA W/O US: CPT | Performed by: STUDENT IN AN ORGANIZED HEALTH CARE EDUCATION/TRAINING PROGRAM

## 2024-07-19 PROCEDURE — 82948 REAGENT STRIP/BLOOD GLUCOSE: CPT

## 2024-07-19 RX ORDER — METHYLPREDNISOLONE ACETATE 80 MG/ML
INJECTION, SUSPENSION INTRA-ARTICULAR; INTRALESIONAL; INTRAMUSCULAR; SOFT TISSUE AS NEEDED
Status: DISCONTINUED | OUTPATIENT
Start: 2024-07-19 | End: 2024-07-19 | Stop reason: HOSPADM

## 2024-07-19 RX ORDER — ROPIVACAINE HYDROCHLORIDE 2 MG/ML
INJECTION, SOLUTION EPIDURAL; INFILTRATION; PERINEURAL
Status: COMPLETED | OUTPATIENT
Start: 2024-07-19 | End: 2024-07-19

## 2024-07-19 RX ORDER — LIDOCAINE HYDROCHLORIDE 10 MG/ML
INJECTION, SOLUTION EPIDURAL; INFILTRATION; INTRACAUDAL; PERINEURAL AS NEEDED
Status: DISCONTINUED | OUTPATIENT
Start: 2024-07-19 | End: 2024-07-19 | Stop reason: HOSPADM

## 2024-07-19 NOTE — DISCHARGE INSTRUCTIONS

## 2024-07-19 NOTE — OP NOTE
Pre-procedure Diagnosis: Hip osteoarthritis  Post-procedure Diagnosis: Hip osteoarthritis   Operation Title(s):  1. left hip intra-articular steroid injection      2. Intraoperative fluoroscopy  Attending Surgeon:   Sergey Brewer MD  Anesthesia:   Local    Indications: The patient is 74 y.o. year-old male with a diagnosis of hip osteoarthritis. The patient's history and physical exam were reviewed.   The risks, benefits and alternatives to the procedure were discussed, and all questions were answered to the patient's satisfaction. The patient agreed to proceed, and written informed consent was obtained.    Procedure in Detail: The patient was brought into the procedure room and placed in the supine position on the fluoroscopy table. The lefthip(s) were prepped with chloraprep time two and draped in a sterile manner.     AP fluoroscopy was used to identify and trever the left femoral neck and head. The C-arm was obliqued 20° to displace the femoral nerve in vessels .The skin and subcutaneous tissues in the area was anesthetized with 1% lidocaine. A 22-gauge, 3½-inch spinal needle was advanced toward the identified point under fluoroscopic guidance until bone was contacted and the joint space was entered.  Then, after negative aspiration, 1-ml Omnipaque 240 contrast solution was injected showing an appropriate arthrogram. Then, a solution consisting of 3-mL 0.2% ropivacaine and 1 ml Depo-Medrol (80mg/ml) was easily injected. The needle was removed with a 1% lidocaine flush.     The patient's hip(s) was cleaned and a bandage was placed over the sites of needle insertion.    Disposition: The patient tolerated the procedure well and there were no apparent complications.  The patient was taken to the recovery area where written discharge instructions for the procedure were given.    Estimated Blood Loss: None  Specimens Obtained: N/A

## 2024-07-24 DIAGNOSIS — E78.2 MIXED HYPERLIPIDEMIA: Primary | ICD-10-CM

## 2024-07-24 RX ORDER — ATORVASTATIN CALCIUM 10 MG/1
10 TABLET, FILM COATED ORAL DAILY
Qty: 90 TABLET | Refills: 1 | Status: SHIPPED | OUTPATIENT
Start: 2024-07-24 | End: 2025-01-20

## 2024-08-02 ENCOUNTER — OFFICE VISIT (OUTPATIENT)
Dept: OBGYN CLINIC | Facility: CLINIC | Age: 74
End: 2024-08-02
Payer: COMMERCIAL

## 2024-08-02 ENCOUNTER — TELEPHONE (OUTPATIENT)
Dept: PAIN MEDICINE | Facility: MEDICAL CENTER | Age: 74
End: 2024-08-02

## 2024-08-02 VITALS
HEIGHT: 68 IN | WEIGHT: 241.2 LBS | BODY MASS INDEX: 36.56 KG/M2 | SYSTOLIC BLOOD PRESSURE: 129 MMHG | HEART RATE: 91 BPM | DIASTOLIC BLOOD PRESSURE: 79 MMHG

## 2024-08-02 DIAGNOSIS — M16.12 PRIMARY OSTEOARTHRITIS OF LEFT HIP: Primary | ICD-10-CM

## 2024-08-02 DIAGNOSIS — M25.552 LEFT HIP PAIN: ICD-10-CM

## 2024-08-02 DIAGNOSIS — M76.892 ENTHESOPATHY OF LEFT HIP REGION: ICD-10-CM

## 2024-08-02 PROCEDURE — 3078F DIAST BP <80 MM HG: CPT | Performed by: ORTHOPAEDIC SURGERY

## 2024-08-02 PROCEDURE — 99213 OFFICE O/P EST LOW 20 MIN: CPT | Performed by: ORTHOPAEDIC SURGERY

## 2024-08-02 PROCEDURE — 1159F MED LIST DOCD IN RCRD: CPT | Performed by: ORTHOPAEDIC SURGERY

## 2024-08-02 PROCEDURE — 1160F RVW MEDS BY RX/DR IN RCRD: CPT | Performed by: ORTHOPAEDIC SURGERY

## 2024-08-02 PROCEDURE — 3074F SYST BP LT 130 MM HG: CPT | Performed by: ORTHOPAEDIC SURGERY

## 2024-08-02 NOTE — PROGRESS NOTES
Assessment/Plan:  1. Primary osteoarthritis of left hip        2. Enthesopathy of left hip region        3. Left hip pain          Scribe Attestation      I,:  Chris Canales PA-C am acting as a scribe while in the presence of the attending physician.:       I,:  Aubrey Patel, DO personally performed the services described in this documentation    as scribed in my presence.:           Pan is a pleasant 74-year-old gentleman presenting today for follow-up 2 weeks after a intra-articular left hip cortisone injection for diagnostic purposes.  Based on the favorable response ASAP from his injection, we can deduce that his intra-articular left hip pathology is the primary source of his pain.  Currently, he states that although he still does have some discomfort in the morning and at the end of the day getting out of a chair, his pain is tolerable and not significant limiting in performing activities of daily living or enjoyment.  Therefore, no further intervention is required at this time.  He does understand that this means he may ultimately need a total hip arthroplasty when the cortisone injections are no longer effective.  He can notify our office if and when his pain returns to further delineate his plan of care.  All questions addressed    Subjective: Left hip follow-up    Patient ID: Pan Gaming is a 74 y.o. male presenting today for follow-up 2 weeks after undergoing a fluoroscopic guided intra-articular left hip cortisone injection by Dr. Brewer.  He did bring a pain diary documenting his response, stating that his hip and thigh pain are better.  He still has symptoms first thing in the morning and when he is getting out of a chair at night to the end of the day, but otherwise is able to perform activities of daily living without significant restriction or pain.  No new injuries     Review of Systems   Constitutional: Negative.  Negative for activity change.   HENT: Negative.     Eyes: Negative.     Respiratory: Negative.     Cardiovascular: Negative.    Gastrointestinal: Negative.    Endocrine: Negative.    Genitourinary: Negative.    Musculoskeletal:  Positive for arthralgias, joint swelling and myalgias.   Skin: Negative.    Allergic/Immunologic: Negative.    Neurological: Negative.    Hematological: Negative.    Psychiatric/Behavioral: Negative.       Past Medical History:   Diagnosis Date    Disease of thyroid gland     Dizziness     Ear problems     Hypertension     Hypothyroidism     Sleep apnea, obstructive     Sleep difficulties        Past Surgical History:   Procedure Laterality Date    APPENDECTOMY      INGUINAL HERNIA REPAIR Left     KNEE ARTHROSCOPY Bilateral     [place] ARTHROCENTESIS ASPIR&/INJ MAJOR JT/BURSA W/O US Left 7/19/2024    Procedure: LEFT INTRA-ARTICULAR HIP INJECTION;  Surgeon: Sergey Brewer MD;  Location: [facility] MAIN OR;  Service: Pain Management     ROTATOR CUFF REPAIR Bilateral     TONSILLECTOMY         Family History   Problem Relation Age of Onset    Diabetes Father     Hypertension Father        Social History     Occupational History    Not on file   Tobacco Use    Smoking status: Never     Passive exposure: Never    Smokeless tobacco: Never   Vaping Use    Vaping status: Never Used   Substance and Sexual Activity    Alcohol use: Yes     Comment: social    Drug use: Never    Sexual activity: Not on file         Current Outpatient Medications:     atorvastatin (LIPITOR) 10 mg tablet, Take 1 tablet (10 mg total) by mouth daily, Disp: 90 tablet, Rfl: 1    ipratropium (ATROVENT) 0.06 % nasal spray, 2 sprays into each nostril 4 (four) times a day as needed for rhinitis, Disp: 15 mL, Rfl: 6    levothyroxine 75 mcg tablet, Take 75 mcg by mouth every morning, Disp: , Rfl:     losartan-hydrochlorothiazide (HYZAAR) 100-12.5 MG per tablet, Take 1 tablet by mouth daily, Disp: , Rfl:     metFORMIN (GLUCOPHAGE) 500 mg tablet, Take 500 mg by mouth daily with breakfast, Disp: , Rfl:      Multiple Vitamins-Minerals (MACUVITE EYE CARE PO), Take by mouth, Disp: , Rfl:     meclizine (ANTIVERT) 25 mg tablet, TAKE 1 TABLET (25 MG TOTAL) BY MOUTH 2 TIMES A DAY AS NEEDED FOR DIZZINESS (Patient not taking: Reported on 3/25/2024), Disp: , Rfl:     Allergies   Allergen Reactions    Sulfamethoxazole-Trimethoprim Swelling     Bactrim. Per pt, also itching       Objective:  Vitals:    08/02/24 1344   BP: 129/79   Pulse: 91       Body mass index is 36.35 kg/m².    Left Hip Exam     Tenderness   The patient is experiencing tenderness in the anterior.    Range of Motion   Abduction:  35 normal   Adduction:  20 normal   Extension:  0 normal   Flexion:  120 normal   External rotation:  50 normal   Internal rotation: 20 normal     Muscle Strength   Abduction: 5/5   Adduction: 5/5   Flexion: 5/5     Tests   ALEXANDRA: negative    Other   Erythema: absent  Scars: absent  Sensation: normal  Pulse: present    Comments:  Tender mid thigh  Overhanging abdominal pannus  Negative Stinchfield  FADIR: negative            Physical Exam  Vitals and nursing note reviewed.   Constitutional:       Appearance: Normal appearance. He is well-developed.      Comments: Body mass index is 36.35 kg/m².   HENT:      Head: Normocephalic and atraumatic.      Right Ear: External ear normal.      Left Ear: External ear normal.   Eyes:      Extraocular Movements: Extraocular movements intact.      Conjunctiva/sclera: Conjunctivae normal.   Cardiovascular:      Rate and Rhythm: Normal rate.      Pulses: Normal pulses.   Pulmonary:      Effort: Pulmonary effort is normal.   Musculoskeletal:      Cervical back: Normal range of motion.      Comments: See ortho exam   Skin:     General: Skin is warm and dry.   Neurological:      General: No focal deficit present.      Mental Status: He is alert and oriented to person, place, and time. Mental status is at baseline.   Psychiatric:         Mood and Affect: Mood normal.         Behavior: Behavior normal.          Thought Content: Thought content normal.         Judgment: Judgment normal.         This document was created using speech voice recognition software.   Grammatical errors, random word insertions, pronoun errors, and incomplete sentences are an occasional consequence of this system due to software limitations, ambient noise, and hardware issues.   Any formal questions or concerns about content, text, or information contained within the body of this dictation should be directly addressed to the provider for clarification.

## 2024-08-02 NOTE — TELEPHONE ENCOUNTER
Patient reports 30-40% improvement post inj  Pain level 4/10   PATIENT STATED STILL HAVING LEG PAIN AT THIS TIME.

## 2024-08-09 DIAGNOSIS — E78.2 MIXED HYPERLIPIDEMIA: ICD-10-CM

## 2024-08-10 RX ORDER — ATORVASTATIN CALCIUM 10 MG/1
10 TABLET, FILM COATED ORAL DAILY
Qty: 90 TABLET | Refills: 1 | Status: SHIPPED | OUTPATIENT
Start: 2024-08-10 | End: 2025-02-06

## 2024-10-07 ENCOUNTER — TELEPHONE (OUTPATIENT)
Age: 74
End: 2024-10-07

## 2024-10-07 NOTE — TELEPHONE ENCOUNTER
Sergey Brewer MD  You14 minutes ago (10:39 AM)       This was a direct referral from orthopedics. Given he did not receive significant benefit would want patient to follow up with orthopedics first before any repeat injection therapy.

## 2024-10-07 NOTE — TELEPHONE ENCOUNTER
Caller: Pan     Doctor: Osman     Reason for call: Patient calling asking if he can schedule another injection last injection in July please advise     Call back#: [phone]

## 2024-10-07 NOTE — TELEPHONE ENCOUNTER
"S/w pt, c/o pain \"a little bit\" in his low back, midline and down the front of his L leg, sometimes down the back of his leg, stopping at the knee. Pt denied any pain in his R leg. Stated that his pain has been significant x a couple weeks with no obvious cause.     Pt stated that he had significant improvement x ~ 1 week after his L hip inj with Dr. Brewer in July, but stated that he had to use otc medications to manage his pain after that. Advised pt, the writer will d/w Dr. Brewer and this office will cb to advise. Pt verbalized understanding and appreciation. Denied blood thinning medication and dm.   "

## 2024-10-10 ENCOUNTER — OFFICE VISIT (OUTPATIENT)
Dept: OBGYN CLINIC | Facility: CLINIC | Age: 74
End: 2024-10-10
Payer: COMMERCIAL

## 2024-10-10 VITALS
WEIGHT: 236.6 LBS | SYSTOLIC BLOOD PRESSURE: 150 MMHG | HEART RATE: 67 BPM | BODY MASS INDEX: 35.86 KG/M2 | DIASTOLIC BLOOD PRESSURE: 74 MMHG | HEIGHT: 68 IN

## 2024-10-10 DIAGNOSIS — M25.552 LEFT HIP PAIN: ICD-10-CM

## 2024-10-10 DIAGNOSIS — Z01.818 PRE-OP EXAM: ICD-10-CM

## 2024-10-10 DIAGNOSIS — Z86.79 HISTORY OF HYPERTENSION: ICD-10-CM

## 2024-10-10 DIAGNOSIS — M76.892 ENTHESOPATHY OF LEFT HIP REGION: ICD-10-CM

## 2024-10-10 DIAGNOSIS — M16.12 PRIMARY OSTEOARTHRITIS OF LEFT HIP: Primary | ICD-10-CM

## 2024-10-10 PROCEDURE — 99215 OFFICE O/P EST HI 40 MIN: CPT | Performed by: ORTHOPAEDIC SURGERY

## 2024-10-10 NOTE — PROGRESS NOTES
Assessment/Plan:  1. Primary osteoarthritis of left hip  Case request operating room: ARTHROPLASTY HIP TOTAL ANTERIOR,NAVIGATED - LEFT - SAME DAY    Comprehensive metabolic panel    Hemoglobin A1C W/EAG Estimation    CBC and differential    Protime-INR    APTT    MRSA culture    Ambulatory referral to Cardiology    Ambulatory referral to Family Practice    Ambulatory referral to Physical Therapy    EKG 12 lead    Comprehensive metabolic panel    CBC and differential    Protime-INR    APTT    MRSA culture    Case request operating room: ARTHROPLASTY HIP TOTAL ANTERIOR,NAVIGATED - LEFT - SAME DAY      2. Enthesopathy of left hip region        3. Left hip pain  Case request operating room: ARTHROPLASTY HIP TOTAL ANTERIOR,NAVIGATED - LEFT - SAME DAY    Comprehensive metabolic panel    Hemoglobin A1C W/EAG Estimation    CBC and differential    Protime-INR    APTT    MRSA culture    Ambulatory referral to Cardiology    Ambulatory referral to Family Practice    Ambulatory referral to Physical Therapy    EKG 12 lead    Comprehensive metabolic panel    CBC and differential    Protime-INR    APTT    MRSA culture    Case request operating room: ARTHROPLASTY HIP TOTAL ANTERIOR,NAVIGATED - LEFT - SAME DAY      4. History of hypertension  Ambulatory referral to Family Practice      5. Pre-op exam  Ambulatory referral to Cardiology    Ambulatory referral to Family Practice    Ambulatory referral to Physical Therapy        Scribe Attestation      I,:  Wilian Blair am acting as a scribe while in the presence of the attending physician.:       I,:  Aubrey Patel, DO personally performed the services described in this documentation    as scribed in my presence.:           Pan is a pleasant 74-year-old gentleman who returns today for follow-up evaluation for his left hip.  He remains symptomatic of his underlying osteoarthritis despite activity modification, maintaining appropriate weight, over-the-counter medications, and  intra-articular injection therapy.  Based on the progression of his disease and his persistent pain, I explained that he is a good candidate for direct anterior left total hip arthroplasty. The pre lilliana and postoperative expectations were discussed here in the office today. The risks and benefits of undergoing direct anterior left total hip arthroplasty were discussed at length and consents were signed and placed in the chart.  Please see risk discussion below.  He denies a history of DVT/PE, MRSA infection, diabetes, malignancy, GI bleeding or peptic ulcer.  He is not a smoker and is not using turmeric.  He understands he requires preoperative clearance from his primary care physician and cardiologist.  He will meet with my surgery scheduler today to pick a date for his procedure and make preoperative arrangements.  He will participate in outpatient physical therapy postoperatively and will be placed on our same-day discharge pathway.  All of his questions and concerns were addressed today.  Will plan to see him back at time of surgery.    Subjective: Follow-up evaluation for left hip    Patient ID: Pan Gaming is a 74 y.o. male who returns today for follow-up evaluation for his left hip.  At today's visit, he reports persistent activity related pain in his left hip.  He describes aching pain in the hip and groin that can be severe.  He did receive good temporary relief from intra-articular injection performed in July.  He is here today to discuss total hip arthroplasty.  He is interested in pursuing the surgery in December.  He denies any new injury or trauma.    Review of Systems   Constitutional:  Positive for activity change. Negative for chills, fever and unexpected weight change.   HENT:  Negative for hearing loss, nosebleeds and sore throat.    Eyes:  Negative for pain, redness and visual disturbance.   Respiratory:  Negative for cough, shortness of breath and wheezing.    Cardiovascular:  Negative for  chest pain, palpitations and leg swelling.   Gastrointestinal:  Negative for abdominal pain, nausea and vomiting.   Endocrine: Negative for polyphagia and polyuria.   Genitourinary:  Negative for dysuria and hematuria.   Musculoskeletal:  Positive for arthralgias and myalgias. Negative for joint swelling.        See HPI   Skin:  Negative for rash and wound.   Neurological:  Negative for dizziness, numbness and headaches.   Psychiatric/Behavioral:  Negative for decreased concentration and suicidal ideas. The patient is not nervous/anxious.          Past Medical History:   Diagnosis Date    Disease of thyroid gland     Dizziness     Ear problems     Hypertension     Hypothyroidism     Sleep apnea, obstructive     Sleep difficulties        Past Surgical History:   Procedure Laterality Date    APPENDECTOMY      INGUINAL HERNIA REPAIR Left     KNEE ARTHROSCOPY Bilateral     [place] ARTHROCENTESIS ASPIR&/INJ MAJOR JT/BURSA W/O US Left 7/19/2024    Procedure: LEFT INTRA-ARTICULAR HIP INJECTION;  Surgeon: Sergey Brewer MD;  Location: [facility] MAIN OR;  Service: Pain Management     ROTATOR CUFF REPAIR Bilateral     TONSILLECTOMY         Family History   Problem Relation Age of Onset    Diabetes Father     Hypertension Father        Social History     Occupational History    Not on file   Tobacco Use    Smoking status: Never     Passive exposure: Never    Smokeless tobacco: Never   Vaping Use    Vaping status: Never Used   Substance and Sexual Activity    Alcohol use: Yes     Comment: social    Drug use: Never    Sexual activity: Not on file         Current Outpatient Medications:     atorvastatin (LIPITOR) 10 mg tablet, Take 1 tablet (10 mg total) by mouth daily, Disp: 90 tablet, Rfl: 1    ipratropium (ATROVENT) 0.06 % nasal spray, 2 sprays into each nostril 4 (four) times a day as needed for rhinitis, Disp: 15 mL, Rfl: 6    levothyroxine 75 mcg tablet, Take 75 mcg by mouth every morning, Disp: , Rfl:      losartan-hydrochlorothiazide (HYZAAR) 100-12.5 MG per tablet, Take 1 tablet by mouth daily, Disp: , Rfl:     metFORMIN (GLUCOPHAGE) 500 mg tablet, Take 500 mg by mouth daily with breakfast, Disp: , Rfl:     Multiple Vitamins-Minerals (MACUVITE EYE CARE PO), Take by mouth, Disp: , Rfl:     meclizine (ANTIVERT) 25 mg tablet, TAKE 1 TABLET (25 MG TOTAL) BY MOUTH 2 TIMES A DAY AS NEEDED FOR DIZZINESS (Patient not taking: Reported on 3/25/2024), Disp: , Rfl:     Allergies   Allergen Reactions    Sulfamethoxazole-Trimethoprim Swelling     Bactrim. Per pt, also itching       Objective:  Vitals:    10/10/24 1411   BP: 150/74   Pulse: 67       Body mass index is 35.66 kg/m².    Left Hip Exam     Tenderness   The patient is experiencing tenderness in the anterior, posterior and lateral.    Range of Motion   Abduction:  50   Adduction:  10   Extension:  0   Flexion:  90   External rotation:  30   Internal rotation: 0     Muscle Strength   Abduction: 5/5   Adduction: 5/5   Flexion: 5/5     Tests   ALEXANDRA: positive    Other   Erythema: absent  Scars: absent  Sensation: normal  Pulse: present    Comments:  Skin intact slight overhanging abdominal pelvis with protuberant abdomen            Physical Exam  Vitals and nursing note reviewed.   Constitutional:       Appearance: Normal appearance. He is well-developed.   HENT:      Head: Normocephalic and atraumatic.      Right Ear: External ear normal.      Left Ear: External ear normal.      Nose: Nose normal.   Eyes:      General: No scleral icterus.     Extraocular Movements: Extraocular movements intact.      Conjunctiva/sclera: Conjunctivae normal.   Cardiovascular:      Rate and Rhythm: Normal rate.   Pulmonary:      Effort: Pulmonary effort is normal. No respiratory distress.   Musculoskeletal:      Cervical back: Normal range of motion and neck supple.      Comments: See Ortho exam   Skin:     General: Skin is warm and dry.   Neurological:      General: No focal deficit present.       Mental Status: He is alert and oriented to person, place, and time.   Psychiatric:         Behavior: Behavior normal.         I have personally reviewed pertinent films in PACS.    X-ray of the left obtained on 2/21/2024 reviewed demonstrating moderate degenerative change with narrowing and osteophytosis.  There is no acute fracture, dislocation, ligamentous lesion.    The patient was counseled in detail regarding the diagnosis, the treatment options available, the prognosis of each treatment option, the potential risks and complications.  These are, but are not limited to; deep vein thrombosis, pulmonary embolism, neurologic and vascular injury, infection, instability, leg length discrepancy, dislocation, hematoma, reflex sympathetic dystrophy, loss of range of motion, ankylosis of the knee, fracture, screw or prosthetic perforation, chronic pain, acute pain, chronic leg pain and edema, loosening, death, heart attack, and stroke.  The patient's questions were answered in detail.  The patient demonstrates understanding of these risks and wishes to proceed with surgery.    This document was created using speech voice recognition software.   Grammatical errors, random word insertions, pronoun errors, and incomplete sentences are an occasional consequence of this system due to software limitations, ambient noise, and hardware issues.   Any formal questions or concerns about content, text, or information contained within the body of this dictation should be directly addressed to the provider for clarification.

## 2024-10-30 DIAGNOSIS — E78.2 MIXED HYPERLIPIDEMIA: ICD-10-CM

## 2024-10-30 RX ORDER — ATORVASTATIN CALCIUM 10 MG/1
10 TABLET, FILM COATED ORAL DAILY
Qty: 90 TABLET | Refills: 1 | Status: SHIPPED | OUTPATIENT
Start: 2024-10-30 | End: 2025-04-28

## 2024-11-11 ENCOUNTER — TELEPHONE (OUTPATIENT)
Dept: OBGYN CLINIC | Facility: HOSPITAL | Age: 74
End: 2024-11-11

## 2024-11-11 NOTE — TELEPHONE ENCOUNTER
Preoperative Elective Admission Assessment    Pt is going for preop testing the week of 11/25/24.      Living Situation:    Who does pt live with: relative, daughter   What kind of home: single level  How do they enter the home: front  How many levels in home: 1   # of steps to enter home: 1  Sleeping arrangement: first/entry floor  Where is Bathroom: first floor   Where is the tub or shower: first floor, walk in shower      First Floor Setup:   Is there a bathroom: Yes  Where would pt sleep: bed     DME:  DME ordered, RW and BSC 11/11/24. Instructed pt to bring RW on DOS, verbalizes understanding.    We discussed clearing pathways in the home and making sure there is accessibly to use the walker, for example, removing throw rugs.      Patient's Current Level of Function: Ambulates: Independently and ADLs: Independent    Post-op Caregiver: relative, daughter   Caregiver Name and phone number for Inpatient discharge needs:     Pan Gaming (Son)  [phone] (Mobile)     Currently receive any HHC/aides/community supports: No     Post-op Transport: relative, son   To/from hospital: relative, son  To/from PT 2-3x/week: relative, son  Uses community transport now: No     Outpatient Physical Therapy Site:  Site: [facility]   pre and post-op appts scheduled? No, will route      Medication Management: self  Preferred Pharmacy for Post-op Medications: [id number] - [address] [[id number]]   Blood Management Vitamin Regimen:  Pt does not have preop vitamins ordered, will route to surgeon. Pt states vitamin d3.   Post-op anticoagulant: to be determined by surgical team postoperatively     DC Plan: Pt plans to be discharged home, discussed same day discharge    Barriers to DC identified preoperatively: none identified    BMI: 35.66    Patient Education:  Pt educated on post-op pain, early mobilization (POD0), LOS goals, OP PT goals, and preoperative bathing. Patient  educated that our goal is to appropriately discharge patient based off their post-op function while striving to maintain maximal independence. The goal is to discharge patient to home and for them to attend outpatient physical therapy.    Assigned to care team? Yes     Mupricion Order? If not, Send to Surgeon    SSI Education/Preoperative Bathing   If you have a packet of Shaq Wipes, Throw Out - not using at this time    Mupirocin/Bactroban will be at pharmacy as a script from surgeon- use in Nose 2x day for 5 days preop    Preoperative Bathing is now using a CHG 8oz bottle (or two 4oz bottles,  at office or OTC Pharmacy) for 5 days before surgery.

## 2024-11-12 DIAGNOSIS — M16.12 PRIMARY OSTEOARTHRITIS OF LEFT HIP: Primary | ICD-10-CM

## 2024-11-12 DIAGNOSIS — Z96.642 STATUS POST TOTAL REPLACEMENT OF LEFT HIP: ICD-10-CM

## 2024-11-12 DIAGNOSIS — M25.552 LEFT HIP PAIN: ICD-10-CM

## 2024-11-12 RX ORDER — FERROUS SULFATE 324(65)MG
324 TABLET, DELAYED RELEASE (ENTERIC COATED) ORAL
Qty: 30 TABLET | Refills: 0 | Status: SHIPPED | OUTPATIENT
Start: 2024-11-12

## 2024-11-12 RX ORDER — ZINC SULFATE 50(220)MG
220 CAPSULE ORAL DAILY
Qty: 30 CAPSULE | Refills: 0 | Status: SHIPPED | OUTPATIENT
Start: 2024-11-12

## 2024-11-12 RX ORDER — FOLIC ACID 1 MG/1
1 TABLET ORAL DAILY
Qty: 30 TABLET | Refills: 0 | Status: SHIPPED | OUTPATIENT
Start: 2024-11-12 | End: 2024-12-12

## 2024-11-12 RX ORDER — MUPIROCIN 20 MG/G
OINTMENT TOPICAL 2 TIMES DAILY
Qty: 15 G | Refills: 0 | Status: SHIPPED | OUTPATIENT
Start: 2024-11-12

## 2024-11-12 NOTE — TELEPHONE ENCOUNTER
Faxed script to [facility], called pt and informed of all has been sent.  Pt verbalized understanding.

## 2024-11-13 DIAGNOSIS — E78.2 MIXED HYPERLIPIDEMIA: ICD-10-CM

## 2024-11-14 RX ORDER — ATORVASTATIN CALCIUM 10 MG/1
10 TABLET, FILM COATED ORAL DAILY
Qty: 90 TABLET | Refills: 1 | Status: SHIPPED | OUTPATIENT
Start: 2024-11-14 | End: 2025-05-13

## 2024-11-16 LAB
DME PARACHUTE DELIVERY DATE ACTUAL: NORMAL
DME PARACHUTE DELIVERY DATE REQUESTED: NORMAL
DME PARACHUTE ITEM DESCRIPTION: NORMAL
DME PARACHUTE ITEM DESCRIPTION: NORMAL
DME PARACHUTE ORDER STATUS: NORMAL
DME PARACHUTE SUPPLIER NAME: NORMAL
DME PARACHUTE SUPPLIER PHONE: NORMAL

## 2024-11-19 ENCOUNTER — APPOINTMENT (OUTPATIENT)
Dept: LAB | Facility: HOSPITAL | Age: 74
End: 2024-11-19
Attending: ORTHOPAEDIC SURGERY
Payer: COMMERCIAL

## 2024-11-20 ENCOUNTER — RESULTS FOLLOW-UP (OUTPATIENT)
Dept: WOUND CARE | Facility: HOSPITAL | Age: 74
End: 2024-11-20

## 2024-11-20 ENCOUNTER — APPOINTMENT (OUTPATIENT)
Dept: PREADMISSION TESTING | Facility: HOSPITAL | Age: 74
End: 2024-11-20
Payer: COMMERCIAL

## 2024-11-20 LAB
ALBUMIN SERPL-MCNC: 4.5 G/DL (ref 3.8–4.8)
ALBUMIN SERPL-MCNC: 4.7 G/DL (ref 3.8–4.8)
ALP SERPL-CCNC: 52 IU/L (ref 44–121)
ALP SERPL-CCNC: 53 IU/L (ref 44–121)
ALT SERPL-CCNC: 22 IU/L (ref 0–44)
ALT SERPL-CCNC: 24 IU/L (ref 0–44)
APTT PPP: 34 SEC (ref 24–33)
AST SERPL-CCNC: 20 IU/L (ref 0–40)
AST SERPL-CCNC: 21 IU/L (ref 0–40)
BASOPHILS # BLD AUTO: 0 X10E3/UL (ref 0–0.2)
BASOPHILS NFR BLD AUTO: 1 %
BILIRUB SERPL-MCNC: 1.1 MG/DL (ref 0–1.2)
BILIRUB SERPL-MCNC: 1.2 MG/DL (ref 0–1.2)
BUN SERPL-MCNC: 16 MG/DL (ref 8–27)
BUN SERPL-MCNC: 17 MG/DL (ref 8–27)
BUN/CREAT SERPL: 17 (ref 10–24)
BUN/CREAT SERPL: 19 (ref 10–24)
CALCIUM SERPL-MCNC: 10.1 MG/DL (ref 8.6–10.2)
CALCIUM SERPL-MCNC: 9.6 MG/DL (ref 8.6–10.2)
CHLORIDE SERPL-SCNC: 96 MMOL/L (ref 96–106)
CHLORIDE SERPL-SCNC: 99 MMOL/L (ref 96–106)
CHOLEST SERPL-MCNC: 137 MG/DL (ref 100–199)
CO2 SERPL-SCNC: 22 MMOL/L (ref 20–29)
CO2 SERPL-SCNC: 23 MMOL/L (ref 20–29)
CREAT SERPL-MCNC: 0.9 MG/DL (ref 0.76–1.27)
CREAT SERPL-MCNC: 0.93 MG/DL (ref 0.76–1.27)
EGFR: 87 ML/MIN/1.73
EGFR: 91 ML/MIN/1.73
EOSINOPHIL # BLD AUTO: 0.1 X10E3/UL (ref 0–0.4)
EOSINOPHIL NFR BLD AUTO: 2 %
ERYTHROCYTE [DISTWIDTH] IN BLOOD BY AUTOMATED COUNT: 12.4 % (ref 11.6–15.4)
ERYTHROCYTE [DISTWIDTH] IN BLOOD BY AUTOMATED COUNT: 12.5 % (ref 11.6–15.4)
GLOBULIN SER-MCNC: 2 G/DL (ref 1.5–4.5)
GLOBULIN SER-MCNC: 2.1 G/DL (ref 1.5–4.5)
GLUCOSE SERPL-MCNC: 99 MG/DL (ref 70–99)
GLUCOSE SERPL-MCNC: 99 MG/DL (ref 70–99)
HCT VFR BLD AUTO: 44.8 % (ref 37.5–51)
HCT VFR BLD AUTO: 45.3 % (ref 37.5–51)
HDLC SERPL-MCNC: 56 MG/DL
HGB BLD-MCNC: 15.1 G/DL (ref 13–17.7)
HGB BLD-MCNC: 15.2 G/DL (ref 13–17.7)
IMM GRANULOCYTES # BLD: 0 X10E3/UL (ref 0–0.1)
IMM GRANULOCYTES NFR BLD: 0 %
INR PPP: 1.1 (ref 0.9–1.2)
LDLC SERPL CALC-MCNC: 63 MG/DL (ref 0–99)
LDLC/HDLC SERPL: 1.1 RATIO (ref 0–3.6)
LYMPHOCYTES # BLD AUTO: 1.4 X10E3/UL (ref 0.7–3.1)
LYMPHOCYTES NFR BLD AUTO: 23 %
MCH RBC QN AUTO: 31.7 PG (ref 26.6–33)
MCH RBC QN AUTO: 32.1 PG (ref 26.6–33)
MCHC RBC AUTO-ENTMCNC: 33.3 G/DL (ref 31.5–35.7)
MCHC RBC AUTO-ENTMCNC: 33.9 G/DL (ref 31.5–35.7)
MCV RBC AUTO: 95 FL (ref 79–97)
MCV RBC AUTO: 95 FL (ref 79–97)
MICRODELETION SYND BLD/T FISH: NORMAL
MONOCYTES # BLD AUTO: 0.6 X10E3/UL (ref 0.1–0.9)
MONOCYTES NFR BLD AUTO: 9 %
NEUTROPHILS # BLD AUTO: 3.9 X10E3/UL (ref 1.4–7)
NEUTROPHILS NFR BLD AUTO: 65 %
PLATELET # BLD AUTO: 226 X10E3/UL (ref 150–450)
PLATELET # BLD AUTO: 227 X10E3/UL (ref 150–450)
POTASSIUM SERPL-SCNC: 4.2 MMOL/L (ref 3.5–5.2)
POTASSIUM SERPL-SCNC: 4.3 MMOL/L (ref 3.5–5.2)
PROT SERPL-MCNC: 6.6 G/DL (ref 6–8.5)
PROT SERPL-MCNC: 6.7 G/DL (ref 6–8.5)
PROTHROMBIN TIME: 11.6 SEC (ref 9.1–12)
RBC # BLD AUTO: 4.74 X10E6/UL (ref 4.14–5.8)
RBC # BLD AUTO: 4.76 X10E6/UL (ref 4.14–5.8)
SL AMB VLDL CHOLESTEROL CALC: 18 MG/DL (ref 5–40)
SODIUM SERPL-SCNC: 136 MMOL/L (ref 134–144)
SODIUM SERPL-SCNC: 138 MMOL/L (ref 134–144)
TRIGL SERPL-MCNC: 99 MG/DL (ref 0–149)
TSH SERPL DL<=0.005 MIU/L-ACNC: 2.64 UIU/ML (ref 0.45–4.5)
WBC # BLD AUTO: 6 X10E3/UL (ref 3.4–10.8)
WBC # BLD AUTO: 6 X10E3/UL (ref 3.4–10.8)

## 2024-11-20 NOTE — PRE-PROCEDURE INSTRUCTIONS
Pre-Surgery Instructions:   Medication Instructions    ascorbic Acid (VITAMIN C) 500 MG CPCR Hold day of surgery.    atorvastatin (LIPITOR) 10 mg tablet Take day of surgery.    Cholecalciferol (VITAMIN D3) 1,000 units tablet Hold day of surgery.    ferrous sulfate 324 (65 Fe) mg Hold day of surgery.    folic acid (FOLVITE) 1 mg tablet Hold day of surgery.    levothyroxine 75 mcg tablet Take day of surgery.    losartan-hydrochlorothiazide (HYZAAR) 100-12.5 MG per tablet Take night before surgery    metFORMIN (GLUCOPHAGE) 500 mg tablet Hold day of surgery.    Multiple Vitamins-Minerals (MACUVITE EYE CARE PO) Stop taking 7 days prior to surgery.    mupirocin (BACTROBAN) 2 % ointment Instructions provided by MD    zinc sulfate (ZINCATE) 220 mg capsule Hold day of surgery.    Medication instructions for day surgery reviewed. Please use only a sip of water to take your instructed medications. Avoid all over the counter vitamins, supplements and NSAIDS for one week prior to surgery per anesthesia guidelines. Tylenol is ok to take as needed.     You will receive a call one business day prior to surgery with an arrival time and hospital directions. If your surgery is scheduled on a Monday, the hospital will be calling you on the Friday prior to your surgery. If you have not heard from anyone by 8pm, please call the hospital supervisor through the hospital  at [phone]. ([place] [phone] or [place] [phone]).    Do not eat or drink anything after midnight the night before your surgery, including candy, mints, lifesavers, or chewing gum. Do not drink alcohol 24hrs before your surgery. Try not to smoke at least 24hrs before your surgery.       Follow the pre surgery showering instructions as listed in the “My Surgical Experience Booklet” or otherwise provided by your surgeon's office. Do not use a blade to shave the surgical area 1 week before surgery. It is okay to use a clean electric clippers up to 24  hours before surgery. Do not apply any lotions, creams, including makeup, cologne, deodorant, or perfumes after showering on the day of your surgery. Do not use dry shampoo, hair spray, hair gel, or any type of hair products.     No contact lenses, eye make-up, or artificial eyelashes. Remove nail polish, including gel polish, and any artificial, gel, or acrylic nails if possible. Remove all jewelry including rings and body piercing jewelry.     Wear causal clothing that is easy to take on and off. Consider your type of surgery.    Keep any valuables, jewelry, piercings at home. Please bring any specially ordered equipment (sling, braces) if indicated.    Arrange for a responsible person to drive you to and from the hospital on the day of your surgery. Please confirm the visitor policy for the day of your procedure when you receive your phone call with an arrival time.     Call the surgeon's office with any new illnesses, exposures, or additional questions prior to surgery.    Please reference your “My Surgical Experience Booklet” for additional information to prepare for your upcoming surgery.

## 2024-11-22 ENCOUNTER — RA CDI HCC (OUTPATIENT)
Dept: OTHER | Facility: HOSPITAL | Age: 74
End: 2024-11-22

## 2024-11-22 DIAGNOSIS — M16.12 PRIMARY OSTEOARTHRITIS OF LEFT HIP: Primary | ICD-10-CM

## 2024-11-22 DIAGNOSIS — M25.552 LEFT HIP PAIN: ICD-10-CM

## 2024-11-22 DIAGNOSIS — Z01.818 PRE-OP EXAM: ICD-10-CM

## 2024-11-25 ENCOUNTER — APPOINTMENT (OUTPATIENT)
Dept: LAB | Facility: HOSPITAL | Age: 74
End: 2024-11-25
Payer: COMMERCIAL

## 2024-12-02 ENCOUNTER — CONSULT (OUTPATIENT)
Dept: FAMILY MEDICINE CLINIC | Facility: CLINIC | Age: 74
End: 2024-12-02
Payer: COMMERCIAL

## 2024-12-02 VITALS
TEMPERATURE: 97.5 F | OXYGEN SATURATION: 97 % | HEART RATE: 65 BPM | HEIGHT: 68 IN | SYSTOLIC BLOOD PRESSURE: 120 MMHG | DIASTOLIC BLOOD PRESSURE: 70 MMHG | BODY MASS INDEX: 36.22 KG/M2 | WEIGHT: 239 LBS | RESPIRATION RATE: 18 BRPM

## 2024-12-02 DIAGNOSIS — I10 BENIGN ESSENTIAL HYPERTENSION: Primary | ICD-10-CM

## 2024-12-02 DIAGNOSIS — Z86.79 HISTORY OF HYPERTENSION: ICD-10-CM

## 2024-12-02 DIAGNOSIS — E78.2 MIXED HYPERLIPIDEMIA: ICD-10-CM

## 2024-12-02 DIAGNOSIS — E03.9 HYPOTHYROIDISM, UNSPECIFIED TYPE: ICD-10-CM

## 2024-12-02 DIAGNOSIS — E66.01 OBESITY, MORBID (HCC): ICD-10-CM

## 2024-12-02 DIAGNOSIS — Z01.818 PRE-OP EXAM: ICD-10-CM

## 2024-12-02 DIAGNOSIS — R73.03 PREDIABETES: ICD-10-CM

## 2024-12-02 DIAGNOSIS — M16.12 PRIMARY OSTEOARTHRITIS OF LEFT HIP: ICD-10-CM

## 2024-12-02 DIAGNOSIS — M25.552 LEFT HIP PAIN: ICD-10-CM

## 2024-12-02 PROCEDURE — 99214 OFFICE O/P EST MOD 30 MIN: CPT | Performed by: FAMILY MEDICINE

## 2024-12-02 PROCEDURE — G2211 COMPLEX E/M VISIT ADD ON: HCPCS | Performed by: FAMILY MEDICINE

## 2024-12-02 NOTE — H&P (VIEW-ONLY)
[facility] PRE-OPERATIVE EVALUATION  [facility]    NAME: Pan Gaming  AGE: 74 y.o. SEX: male  : 1950     DATE: 2024    [facility] Pre-Operative Evaluation      Chief Complaint: Pre-operative Evaluation     Surgery: L hip replacement  Anticipated Date of Surgery:   Surgeon: Dr Patel      History of Present Illness:     Pt is here fopr a pre op  PT states he was seen by Cardiology for clearance - he was seen  and was cleared    Pt marcelle thornton for his chronic conditions        Anesthesia:  general  Bleeding Risk: no recent abnormal bleeding, no remote history of abnormal bleeding, and no use of Ca-channel blockers  Current Anti-platelet/anticoagulation medication:  none    Assessment of Cardiac Risk:  Denies unstable or severe angina or MI in the last 6 weeks or history of stent placement in the last year   Denies decompensated heart failure (e.g. New onset heart failure, NYHA functional class IV heart failure, or worsening existing heart failure)  Denies significant arrhythmias such as high grade AV block, symptomatic ventricular arrhythmia, newly recognized ventricular tachycardia, supraventricular tachycardia with resting heart rate >100, or symptomatic bradycardia  Denies severe heart valve disease including aortic stenosis or symptomatic mitral stenosis     Exercise Capacity:  Able to walk 4 blocks without symptoms?: Yes  Able to walk 2 flights without symptoms?: Yes    Prior Anesthesia Reactions: No     Personal history of venous thromboembolic disease? No    History of steroid use for >2 weeks within last year? No         Review of Systems:     Review of Systems   Constitutional:  Negative for activity change, appetite change, chills, diaphoresis, fatigue, fever and unexpected weight change.   HENT:  Negative for congestion, dental problem, ear pain, mouth sores, sinus pressure, sinus pain, sore throat and trouble  swallowing.    Eyes:  Negative for photophobia, discharge and itching.   Respiratory:  Negative for apnea, chest tightness and shortness of breath.    Cardiovascular:  Negative for chest pain, palpitations and leg swelling.   Gastrointestinal:  Negative for abdominal distention, abdominal pain, blood in stool, nausea and vomiting.   Endocrine: Negative for cold intolerance, heat intolerance, polydipsia, polyphagia and polyuria.   Genitourinary:  Negative for difficulty urinating.   Musculoskeletal:  Positive for arthralgias and gait problem.   Skin:  Negative for color change and wound.   Neurological:  Negative for dizziness, syncope, speech difficulty and headaches.   Hematological:  Negative for adenopathy.   Psychiatric/Behavioral:  Negative for agitation and behavioral problems.        Current Problem List:     Patient Active Problem List   Diagnosis    Benign essential hypertension    Mixed hyperlipidemia    Hypothyroidism    RYANNE on CPAP    Class 1 obesity due to excess calories without serious comorbidity with body mass index (BMI) of 33.0 to 33.9 in adult    Ankle impingement syndrome, left    Chronic pansinusitis    S/P carpal tunnel release    Traumatic tear of right rotator cuff    Vertigo    Vestibular neuronitis of both ears    Prediabetes    Obesity, morbid (HCC)    Benign prostatic hyperplasia without lower urinary tract symptoms    Osteoarthritis of left hip       Allergies:     Allergies   Allergen Reactions    Sulfamethoxazole-Trimethoprim Itching     Bactrim.        Current Medications:       Current Outpatient Medications:     ascorbic Acid (VITAMIN C) 500 MG CPCR, Take 1 capsule (500 mg total) by mouth 2 (two) times a day, Disp: 60 capsule, Rfl: 0    atorvastatin (LIPITOR) 10 mg tablet, Take 1 tablet (10 mg total) by mouth daily, Disp: 90 tablet, Rfl: 1    Cholecalciferol (VITAMIN D3) 1,000 units tablet, Take 1 tablet (1,000 Units total) by mouth daily, Disp: 30 tablet, Rfl: 0    ferrous sulfate  324 (65 Fe) mg, Take 1 tablet (324 mg total) by mouth daily before breakfast, Disp: 30 tablet, Rfl: 0    folic acid (FOLVITE) 1 mg tablet, Take 1 tablet (1 mg total) by mouth daily, Disp: 30 tablet, Rfl: 0    levothyroxine 75 mcg tablet, Take 75 mcg by mouth every morning, Disp: , Rfl:     losartan-hydrochlorothiazide (HYZAAR) 100-12.5 MG per tablet, Take 1 tablet by mouth daily at bedtime, Disp: , Rfl:     metFORMIN (GLUCOPHAGE) 500 mg tablet, Take 500 mg by mouth daily with breakfast, Disp: , Rfl:     Multiple Vitamins-Minerals (MACUVITE EYE CARE PO), Take 1 capsule by mouth daily at bedtime, Disp: , Rfl:     mupirocin (BACTROBAN) 2 % ointment, Apply topically 2 (two) times a day Apply in each nares twice a day for the 5 days before surgery.  You should finish the morning of surgery, Disp: 15 g, Rfl: 0    zinc sulfate (ZINCATE) 220 mg capsule, Take 1 capsule (220 mg total) by mouth daily, Disp: 30 capsule, Rfl: 0    Past Medical History:       Past Medical History:   Diagnosis Date    Arthritis     DJD    CPAP (continuous positive airway pressure) dependence     Disease of thyroid gland     Dizziness     Ear problems     Hypertension     Hypothyroidism     Sleep apnea, obstructive     Sleep difficulties     Wears glasses     for reading        Past Surgical History:   Procedure Laterality Date    APPENDECTOMY      COLONOSCOPY      FOOT SURGERY Left     scar tissue removal from previous injury    INGUINAL HERNIA REPAIR Left     KNEE ARTHROSCOPY Bilateral     CA ARTHROCENTESIS ASPIR&/INJ MAJOR JT/BURSA W/O US Left 07/19/2024    Procedure: LEFT INTRA-ARTICULAR HIP INJECTION;  Surgeon: Sergey Brewer MD;  Location: [facility] MAIN OR;  Service: Pain Management     ROTATOR CUFF REPAIR Bilateral     TONSILLECTOMY          Family History   Problem Relation Age of Onset    Diabetes Father     Hypertension Father         Social History     Socioeconomic History    Marital status:      Spouse name: Not on file    Number  "of children: Not on file    Years of education: Not on file    Highest education level: Not on file   Occupational History    Not on file   Tobacco Use    Smoking status: Never     Passive exposure: Never    Smokeless tobacco: Never   Vaping Use    Vaping status: Never Used   Substance and Sexual Activity    Alcohol use: Yes     Comment: social    Drug use: Never    Sexual activity: Not on file   Other Topics Concern    Not on file   Social History Narrative    Not on file     Social Drivers of Health     Financial Resource Strain: Not on file   Food Insecurity: Not on file   Transportation Needs: Not on file   Physical Activity: Not on file   Stress: Not on file   Social Connections: Not on file   Intimate Partner Violence: Not on file   Housing Stability: Not on file        Physical Exam:     /70   Pulse 65   Temp 97.5 °F (36.4 °C)   Resp 18   Ht 5' 8.3\" (1.735 m)   Wt 108 kg (239 lb)   SpO2 97%   BMI 36.02 kg/m²     Physical Exam  Vitals and nursing note reviewed.   Constitutional:       General: He is not in acute distress.     Appearance: He is well-developed. He is not diaphoretic.   HENT:      Head: Normocephalic and atraumatic.      Right Ear: External ear normal.      Left Ear: External ear normal.      Nose: Nose normal.      Mouth/Throat:      Pharynx: No oropharyngeal exudate.   Eyes:      General: No scleral icterus.        Right eye: No discharge.         Left eye: No discharge.      Pupils: Pupils are equal, round, and reactive to light.   Neck:      Thyroid: No thyromegaly.   Cardiovascular:      Rate and Rhythm: Normal rate.      Heart sounds: Normal heart sounds. No murmur heard.  Pulmonary:      Effort: Pulmonary effort is normal. No respiratory distress.      Breath sounds: Normal breath sounds. No wheezing.   Abdominal:      General: Bowel sounds are normal. There is no distension.      Palpations: Abdomen is soft. There is no mass.      Tenderness: There is no abdominal tenderness. " There is no guarding or rebound.   Musculoskeletal:         General: Normal range of motion.   Skin:     General: Skin is warm and dry.      Findings: No erythema or rash.   Neurological:      Mental Status: He is alert.      Coordination: Coordination normal.      Deep Tendon Reflexes: Reflexes normal.   Psychiatric:         Behavior: Behavior normal.          Data:     Pre-operative work-up    Laboratory Results: I have personally reviewed the pertinent laboratory results/reports  and labs are acceptable     EKG: Pt already seen and cleared by cardiology - Dr Dash    Recent Results (from the past 4 weeks)   Wheeled Walker + 1 more item    Collection Time: 11/11/24  3:50 PM   Result Value Ref Range    Supplier Name [employer]     Supplier Phone Number [phone]     Order Status Delivery Successful     Delivery Note      Delivery Request Date 11/11/2024     Date Delivered  11/14/2024     Item Description Wheeled Walker, Adult     Item Description 3 in 1 Commode    CBC and differential    Collection Time: 11/19/24  7:16 AM   Result Value Ref Range    White Blood Cell Count 6.0 3.4 - 10.8 x10E3/uL    Red Blood Cell Count 4.74 4.14 - 5.80 x10E6/uL    Hemoglobin 15.2 13.0 - 17.7 g/dL    HCT 44.8 37.5 - 51.0 %    MCV 95 79 - 97 fL    MCH 32.1 26.6 - 33.0 pg    MCHC 33.9 31.5 - 35.7 g/dL    RDW 12.4 11.6 - 15.4 %    Platelet Count 226 150 - 450 x10E3/uL    Neutrophils 65 Not Estab. %    Lymphocytes 23 Not Estab. %    Monocytes 9 Not Estab. %    Eosinophils 2 Not Estab. %    Basophils PCT 1 Not Estab. %    Neutrophils (Absolute) 3.9 1.4 - 7.0 x10E3/uL    Lymphocytes (Absolute) 1.4 0.7 - 3.1 x10E3/uL    Monocytes (Absolute) 0.6 0.1 - 0.9 x10E3/uL    Eosinophils (Absolute) 0.1 0.0 - 0.4 x10E3/uL    Basophils ABS 0.0 0.0 - 0.2 x10E3/uL    Immature Granulocytes 0 Not Estab. %    Immature Granulocytes (Absolute) 0.0 0.0 - 0.1 x10E3/uL   Comprehensive metabolic panel    Collection Time: 11/19/24   7:16 AM   Result Value Ref Range    Glucose, Random 99 70 - 99 mg/dL    BUN 17 8 - 27 mg/dL    Creatinine 0.90 0.76 - 1.27 mg/dL    eGFR 91 >59 mL/min/1.73    SL AMB BUN/CREATININE RATIO 19 10 - 24    Sodium 136 134 - 144 mmol/L    Potassium 4.2 3.5 - 5.2 mmol/L    Chloride 96 96 - 106 mmol/L    CO2 23 20 - 29 mmol/L    CALCIUM 9.6 8.6 - 10.2 mg/dL    Protein, Total 6.7 6.0 - 8.5 g/dL    Albumin 4.7 3.8 - 4.8 g/dL    Globulin, Total 2.0 1.5 - 4.5 g/dL    TOTAL BILIRUBIN 1.1 0.0 - 1.2 mg/dL    Alk Phos Isoenzymes 53 44 - 121 IU/L    AST 21 0 - 40 IU/L    ALT 24 0 - 44 IU/L   Protime-INR    Collection Time: 11/19/24  7:16 AM   Result Value Ref Range    INR 1.1 0.9 - 1.2    Prothrombin Time 11.6 9.1 - 12.0 sec   APTT    Collection Time: 11/19/24  7:16 AM   Result Value Ref Range    aPTT 34 (H) 24 - 33 sec   Comprehensive metabolic panel    Collection Time: 11/19/24  7:17 AM   Result Value Ref Range    Glucose, Random 99 70 - 99 mg/dL    BUN 16 8 - 27 mg/dL    Creatinine 0.93 0.76 - 1.27 mg/dL    eGFR 87 >59 mL/min/1.73    SL AMB BUN/CREATININE RATIO 17 10 - 24    Sodium 138 134 - 144 mmol/L    Potassium 4.3 3.5 - 5.2 mmol/L    Chloride 99 96 - 106 mmol/L    CO2 22 20 - 29 mmol/L    CALCIUM 10.1 8.6 - 10.2 mg/dL    Protein, Total 6.6 6.0 - 8.5 g/dL    Albumin 4.5 3.8 - 4.8 g/dL    Globulin, Total 2.1 1.5 - 4.5 g/dL    TOTAL BILIRUBIN 1.2 0.0 - 1.2 mg/dL    Alk Phos Isoenzymes 52 44 - 121 IU/L    AST 20 0 - 40 IU/L    ALT 22 0 - 44 IU/L   Lipid Panel with Direct LDL reflex    Collection Time: 11/19/24  7:17 AM   Result Value Ref Range    Cholesterol, Total 137 100 - 199 mg/dL    Triglycerides 99 0 - 149 mg/dL    HDL 56 >39 mg/dL    VLDL Cholesterol Calculated 18 5 - 40 mg/dL    LDL Calculated 63 0 - 99 mg/dL    LDl/HDL Ratio 1.1 0.0 - 3.6 ratio   TSH, 3rd generation    Collection Time: 11/19/24  7:17 AM   Result Value Ref Range    TSH 2.640 0.450 - 4.500 uIU/mL   CBC    Collection Time: 11/19/24  7:17 AM   Result Value  Ref Range    White Blood Cell Count 6.0 3.4 - 10.8 x10E3/uL    Red Blood Cell Count 4.76 4.14 - 5.80 x10E6/uL    Hemoglobin 15.1 13.0 - 17.7 g/dL    HCT 45.3 37.5 - 51.0 %    MCV 95 79 - 97 fL    MCH 31.7 26.6 - 33.0 pg    MCHC 33.3 31.5 - 35.7 g/dL    RDW 12.5 11.6 - 15.4 %    Platelet Count 227 150 - 450 x10E3/uL   Cardiovascular Report    Collection Time: 11/19/24  7:17 AM   Result Value Ref Range    Interpretation Note    MRSA culture    Collection Time: 11/19/24 10:12 AM    Specimen: Nose; Nares   Result Value Ref Range    MRSA Culture Only       No Methicillin Resistant Staphlyococcus aureus (MRSA) isolated   Hemoglobin A1C    Collection Time: 11/25/24  6:30 AM   Result Value Ref Range    Hemoglobin A1C 5.7 (H) Normal 4.0-5.6%; PreDiabetic 5.7-6.4%; Diabetic >=6.5%; Glycemic control for adults with diabetes <7.0% %     mg/dl           Assessment & Recommendations:     1. Benign essential hypertension  Assessment & Plan:  stable  Orders:  -     Comprehensive metabolic panel; Future; Expected date: 05/16/2025  -     CBC; Future  -     Lipid Panel with Direct LDL reflex; Future; Expected date: 05/16/2025  -     Hemoglobin A1C; Future; Expected date: 05/16/2025  -     TSH, 3rd generation; Future; Expected date: 05/16/2025  2. Primary osteoarthritis of left hip  -     Ambulatory referral to Family Practice  3. Left hip pain  -     Ambulatory referral to Family Practice  4. History of hypertension  -     Ambulatory referral to Family Practice  5. Pre-op exam  -     Ambulatory referral to Family Practice  6. Prediabetes  -     Comprehensive metabolic panel; Future; Expected date: 05/16/2025  -     CBC; Future  -     Lipid Panel with Direct LDL reflex; Future; Expected date: 05/16/2025  -     Hemoglobin A1C; Future; Expected date: 05/16/2025  -     TSH, 3rd generation; Future; Expected date: 05/16/2025  7. Obesity, morbid (HCC)  8. Hypothyroidism, unspecified type  -     Comprehensive metabolic panel; Future;  Expected date: 05/16/2025  -     CBC; Future  -     Lipid Panel with Direct LDL reflex; Future; Expected date: 05/16/2025  -     Hemoglobin A1C; Future; Expected date: 05/16/2025  -     TSH, 3rd generation; Future; Expected date: 05/16/2025  9. Mixed hyperlipidemia  -     Comprehensive metabolic panel; Future; Expected date: 05/16/2025  -     CBC; Future  -     Lipid Panel with Direct LDL reflex; Future; Expected date: 05/16/2025  -     Hemoglobin A1C; Future; Expected date: 05/16/2025  -     TSH, 3rd generation; Future; Expected date: 05/16/2025        Pre-Op Evaluation Assessment  74 y.o. male with planned surgery: L hip replacement.    Known risk factors for perioperative complications: None.        Current medications which may produce withdrawal symptoms if withheld perioperatively: none.    Pre-Op Evaluation Plan  1. Further preoperative workup as follows:   - None; no further preoperative work-up is required    2. Medication Management/Recommendations:   - Patient has been instructed to avoid herbs or non-directed vitamins the week prior to surgery to ensure no drug interactions with perioperative surgical and anesthetic medications.  - Patient has been instructed to avoid aspirin containing medications or non-steroidal anti-inflammatory drugs for the week preceding surgery.    Lipitor - ok to take night before surgery  Levothyroxine - skip  Hyzaar - ok to take in afternoon [facility]  Metfromin - skip      3. Prophylaxis for cardiac events with perioperative beta-blockers: not indicated.    4. Patient requires further consultation with: None    Clearance  Patient is CLEARED for surgery without any additional cardiac testing.     Frank LombardiPenn State Health St[facility]  [address] [address]  Phone#  [phone]  Fax#  [phone]

## 2024-12-02 NOTE — PROGRESS NOTES
[facility] PRE-OPERATIVE EVALUATION  [facility]    NAME: Pan Gaming  AGE: 74 y.o. SEX: male  : 1950     DATE: 2024    [facility] Pre-Operative Evaluation      Chief Complaint: Pre-operative Evaluation     Surgery: L hip replacement  Anticipated Date of Surgery:   Surgeon: Dr Patel      History of Present Illness:     Pt is here fopr a pre op  PT states he was seen by Cardiology for clearance - he was seen  and was cleared    Pt marcelle thornton for his chronic conditions        Anesthesia:  general  Bleeding Risk: no recent abnormal bleeding, no remote history of abnormal bleeding, and no use of Ca-channel blockers  Current Anti-platelet/anticoagulation medication:  none    Assessment of Cardiac Risk:  Denies unstable or severe angina or MI in the last 6 weeks or history of stent placement in the last year   Denies decompensated heart failure (e.g. New onset heart failure, NYHA functional class IV heart failure, or worsening existing heart failure)  Denies significant arrhythmias such as high grade AV block, symptomatic ventricular arrhythmia, newly recognized ventricular tachycardia, supraventricular tachycardia with resting heart rate >100, or symptomatic bradycardia  Denies severe heart valve disease including aortic stenosis or symptomatic mitral stenosis     Exercise Capacity:  Able to walk 4 blocks without symptoms?: Yes  Able to walk 2 flights without symptoms?: Yes    Prior Anesthesia Reactions: No     Personal history of venous thromboembolic disease? No    History of steroid use for >2 weeks within last year? No         Review of Systems:     Review of Systems   Constitutional:  Negative for activity change, appetite change, chills, diaphoresis, fatigue, fever and unexpected weight change.   HENT:  Negative for congestion, dental problem, ear pain, mouth sores, sinus pressure, sinus pain, sore throat and trouble  swallowing.    Eyes:  Negative for photophobia, discharge and itching.   Respiratory:  Negative for apnea, chest tightness and shortness of breath.    Cardiovascular:  Negative for chest pain, palpitations and leg swelling.   Gastrointestinal:  Negative for abdominal distention, abdominal pain, blood in stool, nausea and vomiting.   Endocrine: Negative for cold intolerance, heat intolerance, polydipsia, polyphagia and polyuria.   Genitourinary:  Negative for difficulty urinating.   Musculoskeletal:  Positive for arthralgias and gait problem.   Skin:  Negative for color change and wound.   Neurological:  Negative for dizziness, syncope, speech difficulty and headaches.   Hematological:  Negative for adenopathy.   Psychiatric/Behavioral:  Negative for agitation and behavioral problems.        Current Problem List:     Patient Active Problem List   Diagnosis    Benign essential hypertension    Mixed hyperlipidemia    Hypothyroidism    RYANNE on CPAP    Class 1 obesity due to excess calories without serious comorbidity with body mass index (BMI) of 33.0 to 33.9 in adult    Ankle impingement syndrome, left    Chronic pansinusitis    S/P carpal tunnel release    Traumatic tear of right rotator cuff    Vertigo    Vestibular neuronitis of both ears    Prediabetes    Obesity, morbid (HCC)    Benign prostatic hyperplasia without lower urinary tract symptoms    Osteoarthritis of left hip       Allergies:     Allergies   Allergen Reactions    Sulfamethoxazole-Trimethoprim Itching     Bactrim.        Current Medications:       Current Outpatient Medications:     ascorbic Acid (VITAMIN C) 500 MG CPCR, Take 1 capsule (500 mg total) by mouth 2 (two) times a day, Disp: 60 capsule, Rfl: 0    atorvastatin (LIPITOR) 10 mg tablet, Take 1 tablet (10 mg total) by mouth daily, Disp: 90 tablet, Rfl: 1    Cholecalciferol (VITAMIN D3) 1,000 units tablet, Take 1 tablet (1,000 Units total) by mouth daily, Disp: 30 tablet, Rfl: 0    ferrous sulfate  324 (65 Fe) mg, Take 1 tablet (324 mg total) by mouth daily before breakfast, Disp: 30 tablet, Rfl: 0    folic acid (FOLVITE) 1 mg tablet, Take 1 tablet (1 mg total) by mouth daily, Disp: 30 tablet, Rfl: 0    levothyroxine 75 mcg tablet, Take 75 mcg by mouth every morning, Disp: , Rfl:     losartan-hydrochlorothiazide (HYZAAR) 100-12.5 MG per tablet, Take 1 tablet by mouth daily at bedtime, Disp: , Rfl:     metFORMIN (GLUCOPHAGE) 500 mg tablet, Take 500 mg by mouth daily with breakfast, Disp: , Rfl:     Multiple Vitamins-Minerals (MACUVITE EYE CARE PO), Take 1 capsule by mouth daily at bedtime, Disp: , Rfl:     mupirocin (BACTROBAN) 2 % ointment, Apply topically 2 (two) times a day Apply in each nares twice a day for the 5 days before surgery.  You should finish the morning of surgery, Disp: 15 g, Rfl: 0    zinc sulfate (ZINCATE) 220 mg capsule, Take 1 capsule (220 mg total) by mouth daily, Disp: 30 capsule, Rfl: 0    Past Medical History:       Past Medical History:   Diagnosis Date    Arthritis     DJD    CPAP (continuous positive airway pressure) dependence     Disease of thyroid gland     Dizziness     Ear problems     Hypertension     Hypothyroidism     Sleep apnea, obstructive     Sleep difficulties     Wears glasses     for reading        Past Surgical History:   Procedure Laterality Date    APPENDECTOMY      COLONOSCOPY      FOOT SURGERY Left     scar tissue removal from previous injury    INGUINAL HERNIA REPAIR Left     KNEE ARTHROSCOPY Bilateral     CO ARTHROCENTESIS ASPIR&/INJ MAJOR JT/BURSA W/O US Left 07/19/2024    Procedure: LEFT INTRA-ARTICULAR HIP INJECTION;  Surgeon: Sergey Brewer MD;  Location: [facility] MAIN OR;  Service: Pain Management     ROTATOR CUFF REPAIR Bilateral     TONSILLECTOMY          Family History   Problem Relation Age of Onset    Diabetes Father     Hypertension Father         Social History     Socioeconomic History    Marital status:      Spouse name: Not on file    Number  "of children: Not on file    Years of education: Not on file    Highest education level: Not on file   Occupational History    Not on file   Tobacco Use    Smoking status: Never     Passive exposure: Never    Smokeless tobacco: Never   Vaping Use    Vaping status: Never Used   Substance and Sexual Activity    Alcohol use: Yes     Comment: social    Drug use: Never    Sexual activity: Not on file   Other Topics Concern    Not on file   Social History Narrative    Not on file     Social Drivers of Health     Financial Resource Strain: Not on file   Food Insecurity: Not on file   Transportation Needs: Not on file   Physical Activity: Not on file   Stress: Not on file   Social Connections: Not on file   Intimate Partner Violence: Not on file   Housing Stability: Not on file        Physical Exam:     /70   Pulse 65   Temp 97.5 °F (36.4 °C)   Resp 18   Ht 5' 8.3\" (1.735 m)   Wt 108 kg (239 lb)   SpO2 97%   BMI 36.02 kg/m²     Physical Exam  Vitals and nursing note reviewed.   Constitutional:       General: He is not in acute distress.     Appearance: He is well-developed. He is not diaphoretic.   HENT:      Head: Normocephalic and atraumatic.      Right Ear: External ear normal.      Left Ear: External ear normal.      Nose: Nose normal.      Mouth/Throat:      Pharynx: No oropharyngeal exudate.   Eyes:      General: No scleral icterus.        Right eye: No discharge.         Left eye: No discharge.      Pupils: Pupils are equal, round, and reactive to light.   Neck:      Thyroid: No thyromegaly.   Cardiovascular:      Rate and Rhythm: Normal rate.      Heart sounds: Normal heart sounds. No murmur heard.  Pulmonary:      Effort: Pulmonary effort is normal. No respiratory distress.      Breath sounds: Normal breath sounds. No wheezing.   Abdominal:      General: Bowel sounds are normal. There is no distension.      Palpations: Abdomen is soft. There is no mass.      Tenderness: There is no abdominal tenderness. " There is no guarding or rebound.   Musculoskeletal:         General: Normal range of motion.   Skin:     General: Skin is warm and dry.      Findings: No erythema or rash.   Neurological:      Mental Status: He is alert.      Coordination: Coordination normal.      Deep Tendon Reflexes: Reflexes normal.   Psychiatric:         Behavior: Behavior normal.          Data:     Pre-operative work-up    Laboratory Results: I have personally reviewed the pertinent laboratory results/reports  and labs are acceptable     EKG: Pt already seen and cleared by cardiology - Dr Dash    Recent Results (from the past 4 weeks)   Wheeled Walker + 1 more item    Collection Time: 11/11/24  3:50 PM   Result Value Ref Range    Supplier Name [employer]     Supplier Phone Number [phone]     Order Status Delivery Successful     Delivery Note      Delivery Request Date 11/11/2024     Date Delivered  11/14/2024     Item Description Wheeled Walker, Adult     Item Description 3 in 1 Commode    CBC and differential    Collection Time: 11/19/24  7:16 AM   Result Value Ref Range    White Blood Cell Count 6.0 3.4 - 10.8 x10E3/uL    Red Blood Cell Count 4.74 4.14 - 5.80 x10E6/uL    Hemoglobin 15.2 13.0 - 17.7 g/dL    HCT 44.8 37.5 - 51.0 %    MCV 95 79 - 97 fL    MCH 32.1 26.6 - 33.0 pg    MCHC 33.9 31.5 - 35.7 g/dL    RDW 12.4 11.6 - 15.4 %    Platelet Count 226 150 - 450 x10E3/uL    Neutrophils 65 Not Estab. %    Lymphocytes 23 Not Estab. %    Monocytes 9 Not Estab. %    Eosinophils 2 Not Estab. %    Basophils PCT 1 Not Estab. %    Neutrophils (Absolute) 3.9 1.4 - 7.0 x10E3/uL    Lymphocytes (Absolute) 1.4 0.7 - 3.1 x10E3/uL    Monocytes (Absolute) 0.6 0.1 - 0.9 x10E3/uL    Eosinophils (Absolute) 0.1 0.0 - 0.4 x10E3/uL    Basophils ABS 0.0 0.0 - 0.2 x10E3/uL    Immature Granulocytes 0 Not Estab. %    Immature Granulocytes (Absolute) 0.0 0.0 - 0.1 x10E3/uL   Comprehensive metabolic panel    Collection Time: 11/19/24   7:16 AM   Result Value Ref Range    Glucose, Random 99 70 - 99 mg/dL    BUN 17 8 - 27 mg/dL    Creatinine 0.90 0.76 - 1.27 mg/dL    eGFR 91 >59 mL/min/1.73    SL AMB BUN/CREATININE RATIO 19 10 - 24    Sodium 136 134 - 144 mmol/L    Potassium 4.2 3.5 - 5.2 mmol/L    Chloride 96 96 - 106 mmol/L    CO2 23 20 - 29 mmol/L    CALCIUM 9.6 8.6 - 10.2 mg/dL    Protein, Total 6.7 6.0 - 8.5 g/dL    Albumin 4.7 3.8 - 4.8 g/dL    Globulin, Total 2.0 1.5 - 4.5 g/dL    TOTAL BILIRUBIN 1.1 0.0 - 1.2 mg/dL    Alk Phos Isoenzymes 53 44 - 121 IU/L    AST 21 0 - 40 IU/L    ALT 24 0 - 44 IU/L   Protime-INR    Collection Time: 11/19/24  7:16 AM   Result Value Ref Range    INR 1.1 0.9 - 1.2    Prothrombin Time 11.6 9.1 - 12.0 sec   APTT    Collection Time: 11/19/24  7:16 AM   Result Value Ref Range    aPTT 34 (H) 24 - 33 sec   Comprehensive metabolic panel    Collection Time: 11/19/24  7:17 AM   Result Value Ref Range    Glucose, Random 99 70 - 99 mg/dL    BUN 16 8 - 27 mg/dL    Creatinine 0.93 0.76 - 1.27 mg/dL    eGFR 87 >59 mL/min/1.73    SL AMB BUN/CREATININE RATIO 17 10 - 24    Sodium 138 134 - 144 mmol/L    Potassium 4.3 3.5 - 5.2 mmol/L    Chloride 99 96 - 106 mmol/L    CO2 22 20 - 29 mmol/L    CALCIUM 10.1 8.6 - 10.2 mg/dL    Protein, Total 6.6 6.0 - 8.5 g/dL    Albumin 4.5 3.8 - 4.8 g/dL    Globulin, Total 2.1 1.5 - 4.5 g/dL    TOTAL BILIRUBIN 1.2 0.0 - 1.2 mg/dL    Alk Phos Isoenzymes 52 44 - 121 IU/L    AST 20 0 - 40 IU/L    ALT 22 0 - 44 IU/L   Lipid Panel with Direct LDL reflex    Collection Time: 11/19/24  7:17 AM   Result Value Ref Range    Cholesterol, Total 137 100 - 199 mg/dL    Triglycerides 99 0 - 149 mg/dL    HDL 56 >39 mg/dL    VLDL Cholesterol Calculated 18 5 - 40 mg/dL    LDL Calculated 63 0 - 99 mg/dL    LDl/HDL Ratio 1.1 0.0 - 3.6 ratio   TSH, 3rd generation    Collection Time: 11/19/24  7:17 AM   Result Value Ref Range    TSH 2.640 0.450 - 4.500 uIU/mL   CBC    Collection Time: 11/19/24  7:17 AM   Result Value  Ref Range    White Blood Cell Count 6.0 3.4 - 10.8 x10E3/uL    Red Blood Cell Count 4.76 4.14 - 5.80 x10E6/uL    Hemoglobin 15.1 13.0 - 17.7 g/dL    HCT 45.3 37.5 - 51.0 %    MCV 95 79 - 97 fL    MCH 31.7 26.6 - 33.0 pg    MCHC 33.3 31.5 - 35.7 g/dL    RDW 12.5 11.6 - 15.4 %    Platelet Count 227 150 - 450 x10E3/uL   Cardiovascular Report    Collection Time: 11/19/24  7:17 AM   Result Value Ref Range    Interpretation Note    MRSA culture    Collection Time: 11/19/24 10:12 AM    Specimen: Nose; Nares   Result Value Ref Range    MRSA Culture Only       No Methicillin Resistant Staphlyococcus aureus (MRSA) isolated   Hemoglobin A1C    Collection Time: 11/25/24  6:30 AM   Result Value Ref Range    Hemoglobin A1C 5.7 (H) Normal 4.0-5.6%; PreDiabetic 5.7-6.4%; Diabetic >=6.5%; Glycemic control for adults with diabetes <7.0% %     mg/dl           Assessment & Recommendations:     1. Benign essential hypertension  Assessment & Plan:  stable  Orders:  -     Comprehensive metabolic panel; Future; Expected date: 05/16/2025  -     CBC; Future  -     Lipid Panel with Direct LDL reflex; Future; Expected date: 05/16/2025  -     Hemoglobin A1C; Future; Expected date: 05/16/2025  -     TSH, 3rd generation; Future; Expected date: 05/16/2025  2. Primary osteoarthritis of left hip  -     Ambulatory referral to Family Practice  3. Left hip pain  -     Ambulatory referral to Family Practice  4. History of hypertension  -     Ambulatory referral to Family Practice  5. Pre-op exam  -     Ambulatory referral to Family Practice  6. Prediabetes  -     Comprehensive metabolic panel; Future; Expected date: 05/16/2025  -     CBC; Future  -     Lipid Panel with Direct LDL reflex; Future; Expected date: 05/16/2025  -     Hemoglobin A1C; Future; Expected date: 05/16/2025  -     TSH, 3rd generation; Future; Expected date: 05/16/2025  7. Obesity, morbid (HCC)  8. Hypothyroidism, unspecified type  -     Comprehensive metabolic panel; Future;  Expected date: 05/16/2025  -     CBC; Future  -     Lipid Panel with Direct LDL reflex; Future; Expected date: 05/16/2025  -     Hemoglobin A1C; Future; Expected date: 05/16/2025  -     TSH, 3rd generation; Future; Expected date: 05/16/2025  9. Mixed hyperlipidemia  -     Comprehensive metabolic panel; Future; Expected date: 05/16/2025  -     CBC; Future  -     Lipid Panel with Direct LDL reflex; Future; Expected date: 05/16/2025  -     Hemoglobin A1C; Future; Expected date: 05/16/2025  -     TSH, 3rd generation; Future; Expected date: 05/16/2025        Pre-Op Evaluation Assessment  74 y.o. male with planned surgery: L hip replacement.    Known risk factors for perioperative complications: None.        Current medications which may produce withdrawal symptoms if withheld perioperatively: none.    Pre-Op Evaluation Plan  1. Further preoperative workup as follows:   - None; no further preoperative work-up is required    2. Medication Management/Recommendations:   - Patient has been instructed to avoid herbs or non-directed vitamins the week prior to surgery to ensure no drug interactions with perioperative surgical and anesthetic medications.  - Patient has been instructed to avoid aspirin containing medications or non-steroidal anti-inflammatory drugs for the week preceding surgery.    Lipitor - ok to take night before surgery  Levothyroxine - skip  Hyzaar - ok to take in afternoon day before [facility]  Metfromin - skip      3. Prophylaxis for cardiac events with perioperative beta-blockers: not indicated.    4. Patient requires further consultation with: None    Clearance  Patient is CLEARED for surgery without any additional cardiac testing.     Frank LombardiCoatesville [facility] Affairs Medical Center  [address] [address]  Phone#  [phone]  Fax#  [phone]

## 2024-12-04 ENCOUNTER — TELEPHONE (OUTPATIENT)
Age: 74
End: 2024-12-04

## 2024-12-04 DIAGNOSIS — M25.552 LEFT HIP PAIN: ICD-10-CM

## 2024-12-04 DIAGNOSIS — M16.12 PRIMARY OSTEOARTHRITIS OF LEFT HIP: ICD-10-CM

## 2024-12-04 DIAGNOSIS — J06.9 UPPER RESPIRATORY TRACT INFECTION, UNSPECIFIED TYPE: Primary | ICD-10-CM

## 2024-12-04 RX ORDER — AZITHROMYCIN 250 MG/1
TABLET, FILM COATED ORAL
Qty: 6 TABLET | Refills: 0 | Status: SHIPPED | OUTPATIENT
Start: 2024-12-04 | End: 2024-12-04

## 2024-12-04 NOTE — TELEPHONE ENCOUNTER
Patient called is having surgery on 12/17/2024. Is having sore throat, nasal congestion and itchy ears. Would like to know if can take something OTC or does he need antibiotic. Would like a call back

## 2024-12-05 RX ORDER — FOLIC ACID 1 MG/1
1000 TABLET ORAL DAILY
Qty: 90 TABLET | Refills: 1 | Status: SHIPPED | OUTPATIENT
Start: 2024-12-05

## 2024-12-05 RX ORDER — FERROUS SULFATE 324(65)MG
324 TABLET, DELAYED RELEASE (ENTERIC COATED) ORAL
Qty: 90 TABLET | Refills: 1 | Status: SHIPPED | OUTPATIENT
Start: 2024-12-05

## 2024-12-05 RX ORDER — CHOLECALCIFEROL (VITAMIN D3) 25 MCG
1000 TABLET ORAL DAILY
Qty: 90 TABLET | Refills: 1 | Status: SHIPPED | OUTPATIENT
Start: 2024-12-05

## 2024-12-10 NOTE — TELEPHONE ENCOUNTER
Pan is aware of Dr Lombardi's message. He is aware to call back if any worsening s/s and to call his surgeon if any doubts. Also, he is aware that surgeon and anesthesiologist also review with him prior to taking him to the OR. JMoyleLLELO

## 2024-12-10 NOTE — TELEPHONE ENCOUNTER
Patient called in stating he had finished antibiotics and still has sniffles and a sore throat. Patient asked if it was still okay for him yo have surgery on the 17th or if he needs to be treated again.  Please advise, thank you

## 2024-12-12 NOTE — DISCHARGE INSTR - AVS FIRST PAGE
Direct Anterior Total Hip Replacement     WHAT YOU SHOULD KNOW:   Minimally invasive total hip replacement is surgery to replace a damaged hip joint with an implant.          AFTER YOU LEAVE:     Medicines:   Anticoagulants  are a type of blood thinner medicine that helps prevent clots. Clots can cause strokes, heart attacks, and death. These medicines may cause you to bleed or bruise more easily.  You will be on full-dose aspirin 325mg twice a day for 6 weeks.    Watch for bleeding from your gums or nose. Watch for blood in your urine and bowel movements. Use a soft washcloth and a soft toothbrush. If you shave, use an electric razor. Avoid activities that can cause bruising or bleeding.    Tell your healthcare provider about all medicines you take because many medicines cannot be used with anticoagulants. Do not start or stop any medicines unless your healthcare provider tells you to. Tell your dentist and other healthcare providers that you take anticoagulants. Wear a bracelet or necklace that says you take this medicine.     NSAIDs  help decrease swelling, pain, and fever. This medicine is available with or without a doctor's order. NSAIDs can cause stomach bleeding or kidney problems in certain people. If you take blood thinner medicine, always ask your orthopedist if NSAIDs are safe for you. Always read the medicine label and follow directions.  You will be given mobic 15mg to take daily for the first 2 weeks postoperatively.    Prescription pain medicine  You should take 975mg of over the counter acetaminophen (tylenol) every 8 hours for baseline pain control. You will also be given oxycodone 5mg tablets. Take 1-2 by mouth every 6 hours as needed for pain.     Stool softeners  make it easier for you to have a bowel movement. You may need this medicine to treat or prevent constipation.  You will be given Colace 100mg to take twice a day    Take your medicine as directed.  Contact your orthopedist if you think  your medicine is not helping or if you have side effects. Tell him if you are allergic to any medicine. Keep a list of the medicines, vitamins, and herbs you take. Include the amounts, and when and why you take them. Bring the list or the pill bottles to follow-up visits. Carry your medicine list with you in case of an emergency.    Follow up with your orthopedist as directed:  You may need to return to have your wound checked. Write down your questions so you remember to ask them during your visits.  Please schedule an appointment to see Dr. Patel 2 weeks after surgery.    Physical therapy:  A physical therapist teaches you exercises to help improve movement and strength, and to decrease pain.  You will begin outpatient physical therapy later this week as planned.    Wound Care:  Please leave the Mepilex dressing in place for 7 days after surgery. After 7 days, you may remove the dressing and leave the incision open to air.  If the incision is uncomfortable under clothing after the Mepilex is removed, you may cover it with a a dry sterile dressing, but should remain dry at all times. Once the dressing is off, please keep the incision dry for another week until your follow up appointment    Self-care:   Use a cane, walker, or crutches as directed.  These devices will help decrease your risk of falling. Your therapist will guide you.     Use ice:  Ice helps decrease swelling and pain. Ice may also help prevent tissue damage. Use an ice pack or put crushed ice in a plastic bag. Cover it with a towel, and place it on your knee for 15 to 20 minutes every hour as directed.    Prevent dislocation of your hip implant:      Avoid extremes of external rotation of the leg    Contact your orthopedist if:  You have a fever over 101.0°F.    You have trouble moving or bending your joint.    You have increased pain and swelling in your joint, even after you take pain medicine.    You have back pain or lower leg pain when you bend  your foot upwards.    You have questions or concerns about your condition or care.    Blood soaks through/saturates your bandage.    Your incision comes apart.    Your incision is red, swollen, or draining pus.    You cannot walk or move your joint, or the limb is numb.    Your leg feels warm, tender, and painful. It may look swollen and red.     Seek immediate care or call 911 if:   You feel like you are going to faint.    You have a seizure or feel confused.     You feel lightheaded, short of breath, and have chest pain.    You have chest pain when you take a deep breath or cough. You may cough up blood.      © 2014 [employer]. Information is for End User's use only and may not be sold, redistributed or otherwise used for commercial purposes. All illustrations and images included in CareNotes® are the copyrighted property of [employer], [employer] or [employer].  The above information is an  only. It is not intended as medical advice for individual conditions or treatments. Talk to your doctor, nurse or pharmacist before following any medical regimen to see if it is safe and effective for you.

## 2024-12-13 ENCOUNTER — ANESTHESIA EVENT (OUTPATIENT)
Dept: PERIOP | Facility: HOSPITAL | Age: 74
End: 2024-12-13
Payer: COMMERCIAL

## 2024-12-17 ENCOUNTER — TELEPHONE (OUTPATIENT)
Age: 74
End: 2024-12-17

## 2024-12-17 ENCOUNTER — ANESTHESIA (OUTPATIENT)
Dept: PERIOP | Facility: HOSPITAL | Age: 74
End: 2024-12-17
Payer: COMMERCIAL

## 2024-12-17 ENCOUNTER — APPOINTMENT (OUTPATIENT)
Dept: RADIOLOGY | Facility: HOSPITAL | Age: 74
End: 2024-12-17
Payer: COMMERCIAL

## 2024-12-17 ENCOUNTER — HOSPITAL ENCOUNTER (OUTPATIENT)
Facility: HOSPITAL | Age: 74
Setting detail: OUTPATIENT SURGERY
Discharge: HOME | End: 2024-12-18
Attending: ORTHOPAEDIC SURGERY | Admitting: ORTHOPAEDIC SURGERY
Payer: COMMERCIAL

## 2024-12-17 DIAGNOSIS — M25.552 LEFT HIP PAIN: ICD-10-CM

## 2024-12-17 DIAGNOSIS — Z96.642 STATUS POST TOTAL REPLACEMENT OF LEFT HIP: Primary | ICD-10-CM

## 2024-12-17 PROBLEM — M16.12 OSTEOARTHRITIS OF LEFT HIP: Status: RESOLVED | Noted: 2024-07-19 | Resolved: 2024-12-17

## 2024-12-17 LAB
ABO GROUP BLD: NORMAL
BLD GP AB SCN SERPL QL: NEGATIVE
GLUCOSE SERPL-MCNC: 92 MG/DL (ref 65–140)
GLUCOSE SERPL-MCNC: 93 MG/DL (ref 65–140)
RH BLD: POSITIVE
SPECIMEN EXPIRATION DATE: NORMAL

## 2024-12-17 PROCEDURE — C9290 INJ, BUPIVACAINE LIPOSOME: HCPCS | Performed by: ANESTHESIOLOGY

## 2024-12-17 PROCEDURE — C1776 JOINT DEVICE (IMPLANTABLE): HCPCS | Performed by: ORTHOPAEDIC SURGERY

## 2024-12-17 PROCEDURE — 73501 X-RAY EXAM HIP UNI 1 VIEW: CPT

## 2024-12-17 PROCEDURE — 97163 PT EVAL HIGH COMPLEX 45 MIN: CPT

## 2024-12-17 PROCEDURE — 86900 BLOOD TYPING SEROLOGIC ABO: CPT | Performed by: NURSE ANESTHETIST, CERTIFIED REGISTERED

## 2024-12-17 PROCEDURE — 86850 RBC ANTIBODY SCREEN: CPT | Performed by: NURSE ANESTHETIST, CERTIFIED REGISTERED

## 2024-12-17 PROCEDURE — 0054T BONE SRGRY CMPTR FLUOR IMAGE: CPT | Performed by: ORTHOPAEDIC SURGERY

## 2024-12-17 PROCEDURE — 86901 BLOOD TYPING SEROLOGIC RH(D): CPT | Performed by: NURSE ANESTHETIST, CERTIFIED REGISTERED

## 2024-12-17 PROCEDURE — 27130 TOTAL HIP ARTHROPLASTY: CPT | Performed by: ORTHOPAEDIC SURGERY

## 2024-12-17 PROCEDURE — 97110 THERAPEUTIC EXERCISES: CPT

## 2024-12-17 PROCEDURE — 82948 REAGENT STRIP/BLOOD GLUCOSE: CPT

## 2024-12-17 DEVICE — BIOLOX DELTA CERAMIC FEMORAL HEAD +1.5 36MM DIA 12/14 TAPER
Type: IMPLANTABLE DEVICE | Site: HIP | Status: FUNCTIONAL
Brand: BIOLOX DELTA

## 2024-12-17 DEVICE — ACTIS DUOFIX HIP PROSTHESIS (FEMORAL STEM 12/14 TAPER CEMENTLESS SIZE 5 HIGH COLLAR)  CE
Type: IMPLANTABLE DEVICE | Site: HIP | Status: FUNCTIONAL
Brand: ACTIS

## 2024-12-17 DEVICE — PINNACLE POROCOAT ACETABULAR SHELL SECTOR II 58MM OD
Type: IMPLANTABLE DEVICE | Site: HIP | Status: FUNCTIONAL
Brand: PINNACLE POROCOAT

## 2024-12-17 DEVICE — APEX HOLE ELIMINATOR - PS
Type: IMPLANTABLE DEVICE | Site: HIP | Status: FUNCTIONAL
Brand: APEX

## 2024-12-17 DEVICE — PINNACLE HIP SOLUTIONS ALTRX POLYETHYLENE ACETABULAR LINER NEUTRAL 36MM ID 58MM OD
Type: IMPLANTABLE DEVICE | Site: HIP | Status: FUNCTIONAL
Brand: PINNACLE ALTRX

## 2024-12-17 RX ORDER — ALBUMIN HUMAN 50 G/1000ML
SOLUTION INTRAVENOUS CONTINUOUS PRN
Status: DISCONTINUED | OUTPATIENT
Start: 2024-12-17 | End: 2024-12-17

## 2024-12-17 RX ORDER — LEVOTHYROXINE SODIUM 75 UG/1
75 TABLET ORAL
Status: DISCONTINUED | OUTPATIENT
Start: 2024-12-18 | End: 2024-12-18 | Stop reason: HOSPADM

## 2024-12-17 RX ORDER — SODIUM CHLORIDE, SODIUM LACTATE, POTASSIUM CHLORIDE, CALCIUM CHLORIDE 600; 310; 30; 20 MG/100ML; MG/100ML; MG/100ML; MG/100ML
75 INJECTION, SOLUTION INTRAVENOUS CONTINUOUS
Status: DISCONTINUED | OUTPATIENT
Start: 2024-12-17 | End: 2024-12-18 | Stop reason: HOSPADM

## 2024-12-17 RX ORDER — SODIUM CHLORIDE, SODIUM LACTATE, POTASSIUM CHLORIDE, CALCIUM CHLORIDE 600; 310; 30; 20 MG/100ML; MG/100ML; MG/100ML; MG/100ML
100 INJECTION, SOLUTION INTRAVENOUS CONTINUOUS
Status: DISCONTINUED | OUTPATIENT
Start: 2024-12-17 | End: 2024-12-17

## 2024-12-17 RX ORDER — MIDAZOLAM HYDROCHLORIDE 2 MG/2ML
INJECTION, SOLUTION INTRAMUSCULAR; INTRAVENOUS AS NEEDED
Status: DISCONTINUED | OUTPATIENT
Start: 2024-12-17 | End: 2024-12-17

## 2024-12-17 RX ORDER — BUPIVACAINE HYDROCHLORIDE 2.5 MG/ML
INJECTION, SOLUTION EPIDURAL; INFILTRATION; INTRACAUDAL
Status: DISCONTINUED | OUTPATIENT
Start: 2024-12-17 | End: 2024-12-17

## 2024-12-17 RX ORDER — FENTANYL CITRATE/PF 50 MCG/ML
50 SYRINGE (ML) INJECTION
Status: DISCONTINUED | OUTPATIENT
Start: 2024-12-17 | End: 2024-12-17 | Stop reason: HOSPADM

## 2024-12-17 RX ORDER — DOCUSATE SODIUM 100 MG/1
100 CAPSULE, LIQUID FILLED ORAL 2 TIMES DAILY
Qty: 60 CAPSULE | Refills: 0 | Status: SHIPPED | OUTPATIENT
Start: 2024-12-17

## 2024-12-17 RX ORDER — MELOXICAM 7.5 MG/1
15 TABLET ORAL DAILY
Status: DISCONTINUED | OUTPATIENT
Start: 2024-12-18 | End: 2024-12-18 | Stop reason: HOSPADM

## 2024-12-17 RX ORDER — DEXAMETHASONE SODIUM PHOSPHATE 4 MG/ML
10 INJECTION, SOLUTION INTRA-ARTICULAR; INTRALESIONAL; INTRAMUSCULAR; INTRAVENOUS; SOFT TISSUE ONCE
Status: COMPLETED | OUTPATIENT
Start: 2024-12-18 | End: 2024-12-18

## 2024-12-17 RX ORDER — ONDANSETRON 2 MG/ML
4 INJECTION INTRAMUSCULAR; INTRAVENOUS ONCE AS NEEDED
Status: DISCONTINUED | OUTPATIENT
Start: 2024-12-17 | End: 2024-12-17 | Stop reason: HOSPADM

## 2024-12-17 RX ORDER — SODIUM CHLORIDE 9 MG/ML
INJECTION, SOLUTION INTRAVENOUS AS NEEDED
Status: DISCONTINUED | OUTPATIENT
Start: 2024-12-17 | End: 2024-12-17 | Stop reason: HOSPADM

## 2024-12-17 RX ORDER — ACETAMINOPHEN 325 MG/1
975 TABLET ORAL ONCE
Status: COMPLETED | OUTPATIENT
Start: 2024-12-17 | End: 2024-12-17

## 2024-12-17 RX ORDER — OXYCODONE HYDROCHLORIDE 5 MG/1
TABLET ORAL
Qty: 40 TABLET | Refills: 0 | Status: SHIPPED | OUTPATIENT
Start: 2024-12-17

## 2024-12-17 RX ORDER — SODIUM CHLORIDE 9 MG/ML
INJECTION, SOLUTION INTRAVENOUS CONTINUOUS PRN
Status: DISCONTINUED | OUTPATIENT
Start: 2024-12-17 | End: 2024-12-17

## 2024-12-17 RX ORDER — HYDROMORPHONE HCL/PF 1 MG/ML
0.5 SYRINGE (ML) INJECTION
Status: DISCONTINUED | OUTPATIENT
Start: 2024-12-17 | End: 2024-12-17 | Stop reason: HOSPADM

## 2024-12-17 RX ORDER — MAGNESIUM HYDROXIDE/ALUMINUM HYDROXICE/SIMETHICONE 120; 1200; 1200 MG/30ML; MG/30ML; MG/30ML
30 SUSPENSION ORAL EVERY 6 HOURS PRN
Status: DISCONTINUED | OUTPATIENT
Start: 2024-12-17 | End: 2024-12-18 | Stop reason: HOSPADM

## 2024-12-17 RX ORDER — EPHEDRINE SULFATE 50 MG/ML
INJECTION INTRAVENOUS AS NEEDED
Status: DISCONTINUED | OUTPATIENT
Start: 2024-12-17 | End: 2024-12-17

## 2024-12-17 RX ORDER — LIDOCAINE HYDROCHLORIDE 10 MG/ML
INJECTION, SOLUTION EPIDURAL; INFILTRATION; INTRACAUDAL; PERINEURAL AS NEEDED
Status: DISCONTINUED | OUTPATIENT
Start: 2024-12-17 | End: 2024-12-17

## 2024-12-17 RX ORDER — BISACODYL 10 MG
10 SUPPOSITORY, RECTAL RECTAL DAILY PRN
Status: DISCONTINUED | OUTPATIENT
Start: 2024-12-17 | End: 2024-12-18 | Stop reason: HOSPADM

## 2024-12-17 RX ORDER — CEFAZOLIN SODIUM 2 G/50ML
2000 SOLUTION INTRAVENOUS ONCE
Status: COMPLETED | OUTPATIENT
Start: 2024-12-17 | End: 2024-12-17

## 2024-12-17 RX ORDER — HYDROMORPHONE HCL/PF 1 MG/ML
0.5 SYRINGE (ML) INJECTION EVERY 2 HOUR PRN
Status: DISCONTINUED | OUTPATIENT
Start: 2024-12-17 | End: 2024-12-18 | Stop reason: HOSPADM

## 2024-12-17 RX ORDER — ACETAMINOPHEN 325 MG/1
975 TABLET ORAL EVERY 8 HOURS
Start: 2024-12-17

## 2024-12-17 RX ORDER — CHLORHEXIDINE GLUCONATE ORAL RINSE 1.2 MG/ML
15 SOLUTION DENTAL ONCE
Status: COMPLETED | OUTPATIENT
Start: 2024-12-17 | End: 2024-12-17

## 2024-12-17 RX ORDER — MAGNESIUM HYDROXIDE 1200 MG/15ML
LIQUID ORAL AS NEEDED
Status: DISCONTINUED | OUTPATIENT
Start: 2024-12-17 | End: 2024-12-17 | Stop reason: HOSPADM

## 2024-12-17 RX ORDER — PROPOFOL 10 MG/ML
INJECTION, EMULSION INTRAVENOUS AS NEEDED
Status: DISCONTINUED | OUTPATIENT
Start: 2024-12-17 | End: 2024-12-17

## 2024-12-17 RX ORDER — ACETAMINOPHEN 325 MG/1
975 TABLET ORAL EVERY 8 HOURS SCHEDULED
Status: DISCONTINUED | OUTPATIENT
Start: 2024-12-17 | End: 2024-12-18 | Stop reason: HOSPADM

## 2024-12-17 RX ORDER — GABAPENTIN 100 MG/1
200 CAPSULE ORAL 2 TIMES DAILY
Status: DISCONTINUED | OUTPATIENT
Start: 2024-12-17 | End: 2024-12-18 | Stop reason: HOSPADM

## 2024-12-17 RX ORDER — OXYCODONE HYDROCHLORIDE 5 MG/1
5 TABLET ORAL EVERY 4 HOURS PRN
Status: DISCONTINUED | OUTPATIENT
Start: 2024-12-17 | End: 2024-12-18 | Stop reason: HOSPADM

## 2024-12-17 RX ORDER — SODIUM CHLORIDE, SODIUM LACTATE, POTASSIUM CHLORIDE, CALCIUM CHLORIDE 600; 310; 30; 20 MG/100ML; MG/100ML; MG/100ML; MG/100ML
125 INJECTION, SOLUTION INTRAVENOUS CONTINUOUS
Status: DISCONTINUED | OUTPATIENT
Start: 2024-12-17 | End: 2024-12-17

## 2024-12-17 RX ORDER — OXYCODONE HYDROCHLORIDE 10 MG/1
10 TABLET ORAL EVERY 4 HOURS PRN
Status: DISCONTINUED | OUTPATIENT
Start: 2024-12-17 | End: 2024-12-18 | Stop reason: HOSPADM

## 2024-12-17 RX ORDER — ONDANSETRON 2 MG/ML
4 INJECTION INTRAMUSCULAR; INTRAVENOUS EVERY 6 HOURS PRN
Status: DISCONTINUED | OUTPATIENT
Start: 2024-12-17 | End: 2024-12-18 | Stop reason: HOSPADM

## 2024-12-17 RX ORDER — ASPIRIN 325 MG
325 TABLET ORAL 2 TIMES DAILY
Qty: 84 TABLET | Refills: 0 | Status: SHIPPED | OUTPATIENT
Start: 2024-12-17 | End: 2025-01-28

## 2024-12-17 RX ORDER — MELOXICAM 15 MG/1
15 TABLET ORAL DAILY
Qty: 14 TABLET | Refills: 0 | Status: SHIPPED | OUTPATIENT
Start: 2024-12-17

## 2024-12-17 RX ORDER — BUPIVACAINE HYDROCHLORIDE 7.5 MG/ML
INJECTION, SOLUTION INTRASPINAL AS NEEDED
Status: DISCONTINUED | OUTPATIENT
Start: 2024-12-17 | End: 2024-12-17

## 2024-12-17 RX ORDER — DOCUSATE SODIUM 100 MG/1
100 CAPSULE, LIQUID FILLED ORAL 2 TIMES DAILY
Status: DISCONTINUED | OUTPATIENT
Start: 2024-12-17 | End: 2024-12-18 | Stop reason: HOSPADM

## 2024-12-17 RX ORDER — ASPIRIN 325 MG
325 TABLET ORAL 2 TIMES DAILY
Status: DISCONTINUED | OUTPATIENT
Start: 2024-12-17 | End: 2024-12-18 | Stop reason: HOSPADM

## 2024-12-17 RX ORDER — OXYCODONE HCL 10 MG/1
10 TABLET, FILM COATED, EXTENDED RELEASE ORAL ONCE
Status: COMPLETED | OUTPATIENT
Start: 2024-12-17 | End: 2024-12-17

## 2024-12-17 RX ORDER — GABAPENTIN 300 MG/1
300 CAPSULE ORAL ONCE
Status: COMPLETED | OUTPATIENT
Start: 2024-12-17 | End: 2024-12-17

## 2024-12-17 RX ORDER — PROPOFOL 10 MG/ML
INJECTION, EMULSION INTRAVENOUS CONTINUOUS PRN
Status: DISCONTINUED | OUTPATIENT
Start: 2024-12-17 | End: 2024-12-17

## 2024-12-17 RX ORDER — TRANEXAMIC ACID 10 MG/ML
1000 INJECTION, SOLUTION INTRAVENOUS ONCE
Status: COMPLETED | OUTPATIENT
Start: 2024-12-17 | End: 2024-12-17

## 2024-12-17 RX ORDER — PANTOPRAZOLE SODIUM 40 MG/1
40 TABLET, DELAYED RELEASE ORAL
Status: DISCONTINUED | OUTPATIENT
Start: 2024-12-18 | End: 2024-12-18 | Stop reason: HOSPADM

## 2024-12-17 RX ORDER — ATORVASTATIN CALCIUM 10 MG/1
10 TABLET, FILM COATED ORAL DAILY
Status: DISCONTINUED | OUTPATIENT
Start: 2024-12-18 | End: 2024-12-18 | Stop reason: HOSPADM

## 2024-12-17 RX ORDER — CEFAZOLIN SODIUM 2 G/50ML
2000 SOLUTION INTRAVENOUS EVERY 6 HOURS
Status: COMPLETED | OUTPATIENT
Start: 2024-12-17 | End: 2024-12-17

## 2024-12-17 RX ADMIN — TRANEXAMIC ACID 1000 MG: 10 INJECTION, SOLUTION INTRAVENOUS at 09:26

## 2024-12-17 RX ADMIN — OXYCODONE HYDROCHLORIDE 10 MG: 10 TABLET, FILM COATED, EXTENDED RELEASE ORAL at 07:32

## 2024-12-17 RX ADMIN — ALBUMIN (HUMAN): 12.5 INJECTION, SOLUTION INTRAVENOUS at 10:08

## 2024-12-17 RX ADMIN — LIDOCAINE HYDROCHLORIDE 50 MG: 10 INJECTION, SOLUTION EPIDURAL; INFILTRATION; INTRACAUDAL; PERINEURAL at 10:31

## 2024-12-17 RX ADMIN — ALBUMIN (HUMAN): 12.5 INJECTION, SOLUTION INTRAVENOUS at 10:53

## 2024-12-17 RX ADMIN — PROPOFOL 80 MG: 10 INJECTION, EMULSION INTRAVENOUS at 09:20

## 2024-12-17 RX ADMIN — SODIUM CHLORIDE, SODIUM LACTATE, POTASSIUM CHLORIDE, AND CALCIUM CHLORIDE 125 ML/HR: .6; .31; .03; .02 INJECTION, SOLUTION INTRAVENOUS at 07:32

## 2024-12-17 RX ADMIN — CHLORHEXIDINE GLUCONATE 15 ML: 1.2 RINSE ORAL at 07:32

## 2024-12-17 RX ADMIN — DOCUSATE SODIUM 100 MG: 100 CAPSULE, LIQUID FILLED ORAL at 21:06

## 2024-12-17 RX ADMIN — SODIUM CHLORIDE: 0.9 INJECTION, SOLUTION INTRAVENOUS at 09:52

## 2024-12-17 RX ADMIN — ASPIRIN 325 MG: 325 TABLET ORAL at 21:06

## 2024-12-17 RX ADMIN — BUPIVACAINE HYDROCHLORIDE 10 ML: 2.5 INJECTION, SOLUTION EPIDURAL; INFILTRATION; INTRACAUDAL at 12:30

## 2024-12-17 RX ADMIN — PHENYLEPHRINE HYDROCHLORIDE 20 MCG/MIN: 10 INJECTION INTRAVENOUS at 09:40

## 2024-12-17 RX ADMIN — SODIUM CHLORIDE, SODIUM LACTATE, POTASSIUM CHLORIDE, AND CALCIUM CHLORIDE: .6; .31; .03; .02 INJECTION, SOLUTION INTRAVENOUS at 11:52

## 2024-12-17 RX ADMIN — CEFAZOLIN SODIUM 2000 MG: 2 SOLUTION INTRAVENOUS at 22:28

## 2024-12-17 RX ADMIN — ACETAMINOPHEN 975 MG: 325 TABLET ORAL at 07:32

## 2024-12-17 RX ADMIN — GABAPENTIN 300 MG: 300 CAPSULE ORAL at 07:32

## 2024-12-17 RX ADMIN — BUPIVACAINE 10 ML: 13.3 INJECTION, SUSPENSION, LIPOSOMAL INFILTRATION at 12:30

## 2024-12-17 RX ADMIN — PROPOFOL 30 MG: 10 INJECTION, EMULSION INTRAVENOUS at 09:35

## 2024-12-17 RX ADMIN — SODIUM CHLORIDE, SODIUM LACTATE, POTASSIUM CHLORIDE, AND CALCIUM CHLORIDE 75 ML/HR: .6; .31; .03; .02 INJECTION, SOLUTION INTRAVENOUS at 15:32

## 2024-12-17 RX ADMIN — MIDAZOLAM 2 MG: 1 INJECTION INTRAMUSCULAR; INTRAVENOUS at 09:16

## 2024-12-17 RX ADMIN — ACETAMINOPHEN 975 MG: 325 TABLET ORAL at 21:06

## 2024-12-17 RX ADMIN — GABAPENTIN 200 MG: 100 CAPSULE ORAL at 18:04

## 2024-12-17 RX ADMIN — OXYCODONE HYDROCHLORIDE 10 MG: 10 TABLET ORAL at 15:19

## 2024-12-17 RX ADMIN — OXYCODONE HYDROCHLORIDE 5 MG: 5 TABLET ORAL at 23:35

## 2024-12-17 RX ADMIN — LIDOCAINE HYDROCHLORIDE 50 MG: 10 INJECTION, SOLUTION EPIDURAL; INFILTRATION; INTRACAUDAL; PERINEURAL at 09:20

## 2024-12-17 RX ADMIN — CEFAZOLIN SODIUM 2000 MG: 2 SOLUTION INTRAVENOUS at 09:26

## 2024-12-17 RX ADMIN — BUPIVACAINE HYDROCHLORIDE IN DEXTROSE 1.6 ML: 7.5 INJECTION, SOLUTION SUBARACHNOID at 09:17

## 2024-12-17 RX ADMIN — CEFAZOLIN SODIUM 2000 MG: 2 SOLUTION INTRAVENOUS at 15:05

## 2024-12-17 RX ADMIN — ACETAMINOPHEN 975 MG: 325 TABLET ORAL at 15:05

## 2024-12-17 RX ADMIN — PROPOFOL 100 MCG/KG/MIN: 10 INJECTION, EMULSION INTRAVENOUS at 09:19

## 2024-12-17 RX ADMIN — IRON SUCROSE 300 MG: 20 INJECTION, SOLUTION INTRAVENOUS at 12:24

## 2024-12-17 RX ADMIN — EPHEDRINE SULFATE 10 MG: 50 INJECTION, SOLUTION INTRAVENOUS at 10:31

## 2024-12-17 NOTE — PLAN OF CARE
Problem: PHYSICAL THERAPY ADULT  Goal: Performs mobility at highest level of function for planned discharge setting.  See evaluation for individualized goals.  Description: Treatment/Interventions: Functional transfer training, LE strengthening/ROM, Therapeutic exercise, Endurance training, Bed mobility, Gait training, Spoke to nursing          See flowsheet documentation for full assessment, interventions and recommendations.  Note: Prognosis: Good  Problem List: Decreased strength, Decreased range of motion, Decreased endurance, Impaired balance, Decreased mobility, Pain, Decreased skin integrity  Assessment: Patient seen for Physical Therapy evaluation. Patient admitted with Status post total replacement of left hip.  Comorbidities affecting patient's physical performance include: arthritis, hypertension, hypothyroid, hyperlipidemia, osteoarthritis, and vertigo.  Personal factors affecting patient at time of initial evaluation include: lives in 1 story house, ambulating with assistive device, stairs to enter home, inability to navigate community distances, inability to navigate level surfaces without external assistance, and inability to perform IADLS . Prior to admission, patient was independent with functional mobility without assistive device, independent with ADLS, independent with IADLS, living with daughter in a 1 level home with 2 steps to enter, ambulating household distance, and ambulating community distances.  Please find objective findings from Physical Therapy assessment regarding body systems outlined above with impairments and limitations including weakness, decreased ROM, impaired balance, decreased endurance, gait deviations, pain, decreased activity tolerance, decreased functional mobility tolerance, fall risk, and decreased skin integrity.  The Barthel Index was used as a functional outcome tool presenting with a score of Barthel Index Score: 65 today indicating moderate limitations of  functional mobility and ADLS.  Patient's clinical presentation is currently unstable/unpredictable as seen in patient's presentation of changing level of pain, increased fall risk, new onset of impairment of functional mobility, decreased endurance, and new onset of weakness. Pt would benefit from continued Physical Therapy treatment to address deficits as defined above and maximize level of functional mobility. As demonstrated by objective findings, the assigned level of complexity for this evaluation is high.The patient's AM-[facility] Basic Mobility Inpatient Short Form Raw Score is 18. A Raw score of greater than 16 suggests the patient may benefit from discharge to home. Please also refer to the recommendation of the Physical Therapist for safe discharge planning.        Rehab Resource Intensity Level, PT: III (Minimum Resource Intensity)    See flowsheet documentation for full assessment.

## 2024-12-17 NOTE — TELEPHONE ENCOUNTER
Spoke with Adilia Irvin who stated the RX from 11/12 is no longer acceptable because they have a policy that a new RX must be written on day of surgery.    Please place new order.  I will fax

## 2024-12-17 NOTE — OP NOTE
OPERATIVE REPORT  PATIENT NAME: Pan Gaming  : 1950  MRN: [id number]  Pt Location:  [facility] OR ROOM 03    Surgery Date: 2024    Surgeons and Role:     * Aubrey Patel,  - Primary     * Chris Canales PA-C - Assisting     * Fanny Cole MD - resident- Assisting      * Jimbo Blair,  ATC/OTC - 2nd Assist    Preop Diagnosis:  Primary osteoarthritis of left hip [M16.12]  Left hip pain [M25.552]    Post-Op Diagnosis Codes:     * Primary osteoarthritis of left hip [M16.12]     * Left hip pain [M25.552]    Procedure(s):  Left - ARTHROPLASTY HIP TOTAL ANTERIOR.NAVIGATED    Specimens:  * No specimens in log *    Estimated Blood Loss:   1300 mL    Drains: None    Anesthesia Type:   Spinal with sedation, postoperative Julio Cesar block with Exparel    Intravenous fluids: 1500 cc crystalloid, 500 cc albumin    Antibiotics: Ancef 2 g    Urine output: No Lama    Implant Name Type Inv. Item Serial No.  Lot No. LRB No. Used Action   SHELL ACETABULAR 58MM POROUS SOLID LCK RING PINNACLE - [id number]  SHELL ACETABULAR 58MM POROUS SOLID LCK RING PINNACLE  DEPUY  Left 1 Implanted   LINER ACTB ULT LNK PE 36 X 58MM 0DEG NEUTRAL ALTRX - [id number]  LINER ACTB ULT LNK PE 36 X 58MM 0DEG NEUTRAL ALTRX  DEPUY  Left 1 Implanted   ELIMINATOR ACTB THRD POS STOP - [id number]  ELIMINATOR ACTB THRD POS STOP  DEPUY  Left 1 Implanted   STEM FEM SZ 5 TAPER CMNTLS HI COLLAR ACTIS - [id number]  STEM FEM SZ 5 TAPER CMNTLS HI COLLAR ACTIS  DEPUY  Left 1 Implanted   HEAD FEMORAL /14 TPR 36MM +1.5MM ARTICULEZE BIOLOX DELTA - [id number]  HEAD FEMORAL 12/14 TPR 36MM +1.5MM ARTICULEZE BIOLOX DELTA  DEPUY  Left 1 Implanted     Operative Indications:  Primary osteoarthritis of left hip [M16.12]  Left hip pain [M25.552]  The patient is a very pleasant 74-year-old male known to me for treatment of his activity related left hip and groin pain.  The patient has attempted multiple forms conservative measure treatment and all have  failed to provide long-lasting relief of his discomfort.  With failed conservative treatment, persistent activity related pain, and end-stage osteoarthritis on x-ray recommended that he undergo direct anterior left total of arthroplasty.  The patient was agreeable to this.  Consents were signed and placed in the chart.  Patient was optimized for the intended procedure and ultimately underwent direct anterior left total arthroplasty on 12/17/2024.    Operative Findings:  Intraoperatively the patient was found to have grade 4 degenerative change of both sides and hip articulation.  The hip navigation software was utilized throughout the case.  During initial exposure of the acetabulum a brisk arterial bleed was encountered which did require significant effort to achieve hemostasis.  This was located medial to the proximal femur at the level of the lesser troch.  Ultimately hemostasis was achieved via various techniques.  The press-fit acetabular component was implanted into the pelvis and approximately 44 degrees of inclination and 21 degrees of anteversion with excellent press-fit.  The polyethylene liner was locked into place.  The press-fit femoral component was implanted down into the proximal femur and approximately 5 to 7 degrees of anteversion and at its final station it had excellent rotational and axial stability.  The final construct demonstrated excellent stability to 50 degrees of internal rotation, 110 degrees of external rotation, and 50 degrees of external rotation and the leg lowered to the floor.  On leg length evaluation the operative extremity was lengthened approximate 4 mm from its preoperative state in accordance with the preoperative template and plan.  Patient tolerated the procedure well.  There were no complications.    Complications:   None    Hip Approach: Direct anterior    Chronic Narcotic Use:  No    Procedure and Technique:  The patient was identified and marked in the preoperative  holding area, and the correct operative extremity and intended procedure was confirmed. The consents were visualized and confirmed for correct procedure and laterality. The patient was then taken back to the operating room where there were administered spinal anesthesia by the anesthesia staff. The patient was administered 2 g of Ancef intravenously by the anesthesia staff. The patient was then transferred to the HANA table for the procedure. After the patient was appropriately positioned, a AP of the pelvis was obtained using fluoroscopy to evaluate preoperative leg lengths. It was found that the left lower extremity was approximately 2 mm shorter than the right lower extremity which also had significant osteoarthritis.  Therefore the plan was to lengthen approximately 4 to 5 mm to account for this.  The left lower extremity was prepped and draped in a standard sterile fashion. After a timeout was performed and all members of the operating room team were in agreement of the correct patient, and correct intended procedure the bony landmarks were identified using marking pen. Tranexamic acid was administered by anesthesia.  A modified Bolden-Sorto incision was delineated obliquely starting 2 cm distal and 2 cm laterally to the ASIS.  Sharp dissection was carried down through the subcutaneous tissues to the investing fascia of the tensor fascia judie muscle. Electrocautery was used to maintain hemostasis in the subcutaneous tissues. The investing fascia of the tensor fascia judie was incised in line with the fibers in this compartment was entered bluntly and the muscle was retracted laterally. The perforating circumflex femoral vessels were carefully identified and cauterized using electrocautery and the Aquamantis. Dissection was then carried down to the left hip capsule, and the pre-capsular fat was excised. A capsulotomy was carried out across the edge of the acetabulum superiorly down the femoral neck and into the  intra-articular trochanteric line. Both sides of the capsulotomy were tagged with a Ethibond suture.  Dissection was carried out medially around the medial calcar. It was also carried out laterally to expose the saddle. The femoral neck osteotomy was templated and carried out using an oscillating saw and finished with a osteotome in accordance with preoperative templating. The femoral head was extracted from the acetabulum using a corkscrew.  There were grade 4 changes diffusely with osteophytes peripherally.  The femoral head was sized.  There were grade 4 changes diffusely in the acetabulum.  Due to the patient's large anatomy the standard periacetabular releases were performed.  When the inferior capsular sling was released electrocautery a brisk arterial bleed was encountered.  This was a small to medium size caliber vessel and atypical for this location.  The bleeding appeared to be coming from a vessel that was ascending proximally from the medial aspect of the femur and down from the lesser troches region.  Significant efforts were made with electrocautery and the aqua Bassam to obtain hemostasis.  The vessel had retracted into the soft tissues of this region after identification.  Ultimately with a combination of electrocautery and aqua Bassam hemostasis was achieved.  The bleeding did recur slightly throughout the remainder of exposure and therefore Surgicel was packed into the location of the vessel after hemostasis was again achieved.  This maintained hemostasis for the remainder of the case.  The acetabulum was cleaned of debris and pulvinar, the KODI was well-visualized, the remaining labrum was removed, and reaming began. The acetabulum was reamed starting with a size 55 reamer and carried up in 1-2 mm increments until a size 57 was reached. A size 57 acetabular trial was impacted into the acetabulum and demonstrated an appropriate fit.  The hip navigation software was utilized throughout the case.  The  appropriate abduction and anteversion of approximately 45° and 20° respectively was confirmed using fluoroscopy. The trial implant was removed, the host bone was touched with a size 58 reamer, and a size 58 [place] 2 cluster hole final acetabular component was impacted into place under direct visualization with fluoroscopy. This demonstrated excellent scratch fit.  Its final position was in approximately 4 to 4 degrees of inclination and 21 degrees of anteversion. The final 36 mm/58 mm Altrx highly cross-linked polyethylene insert was impacted into the acetabular component.  The liner was locked in its position on visual and tactile inspection.    Attention was then turned back to the proximal femur.  The appropriate proximal femoral releases were utilized as needed to mobilize the femur, ensuring not to release the obturator externus.  The soft tissue was removed from the region between the femoral neck and the greater trochanter a box osteotome was used to cut into the lateral aspect of the proximal femur.  The small starter broach was then inserted into the proximal femur adding proximally 5-7° of anteversion while preparing the proximal femur. Broaching was carried up in sequence until a size 5 broach demonstrated excellent fit and rotational stability. The calcar planer was used to bring the femoral neck osteotomy coplanar with the broach. Trialing was performed.  A trial construct with a high neck and a +1.5 36 mm femoral head showed excellent stability on 110 degrees of external rotation, 50 degrees of internal rotation, and 50 degrees of external rotation with extension of the hip to the floor. Leg lengths were evaluated on fluoroscopy images and the hip navigation software.  The current construct demonstrated that the operative extremity was lengthened approximately 4 mm from its preoperative state in accordance with the preoperative template implant.  This was deemed to be the final construct.    The trial components were removed from the proximal femur and the final Depuy Actis size 5 high offset femoral component was inserted into the femur by hand and impacted into place. The trunnion was cleaned and dried and the final Biolox delta ceramic +1.5 36 mm head was impacted upon the final femoral stem. Hip was reduced and taken through stability testing. The patient demonstrated excellent stability with 110 degrees of external rotation, 50 degrees of internal rotation, and 50 degrees of external rotation and extension of the hip to the floor.  There was no lateral subluxation of the hip on manual testing.  Leg lengths were again evaluated using fluoroscopy and the leg lengths were unchanged from trialing.  The wound was copiously irrigated with normal saline followed by Biasurge irrigation.  On close inspection hemostasis remained in the deep intracapsular space.  The capsule was closed using a #2 Ethibond suture. The wound was again irrigated.  The investing fascia of the tensor fascia judie muscle was closed using a bidirectional barbed #1 barbed suture.  The deep subcutaneous tissues were reapproximated using an undyed 0 Vicryl, the superficial subcutaneous tissues were reapproximated using an undyed 2-0 Vicryl, the skin edges were reapproximated using a 3-0 bidirectional barbed Monocryl suture, and the skin edges sealed with Exofin.  After the skin adhesive had dried a silver impregnated Mepilex dressing was applied over the surgical incision.  The patient was awakened from spinal anesthesia with sedation and transferred to PACU in stable condition.     IAubrey DO, was present for the entire procedure and was scrubbed for and performed all the key and essential components of the procedure.  Portions of the procedure performed by the resident were done to under my direct supervision and completed to my satisfaction.     Patient Disposition:  PACU           SIGNATURE: Aubrey Patel DO  DATE:  December 17, 2024  TIME: 4:24 PM

## 2024-12-17 NOTE — PHYSICAL THERAPY NOTE
PHYSICAL THERAPY EVALUATION/TREATMENT       12/17/24 1510   PT Last Visit   PT Visit Date 12/17/24   Note Type   Note type Evaluation   Pain Assessment   Pain Assessment Tool 0-10   Pain Score 5   Pain Location/Orientation Orientation: Left;Location: Hip  (+ reports of chronic lower back pain)   Pain Radiating Towards anterior thigh   Pain Onset/Description Onset: Ongoing;Descriptor: Sore   Effect of Pain on Daily Activities limits rest/mobility   Patient's Stated Pain Goal No pain   Hospital Pain Intervention(s) Repositioned;Ambulation/increased activity;Cold applied   Restrictions/Precautions   Weight Bearing Precautions Per Order Yes   LLE Weight Bearing Per Order WBAT   Other Precautions Fall Risk;Pain   Home Living   Type of Home House   Home Layout One level;Performs ADLs on one level;Able to live on main level with bedroom/bathroom;Stairs to enter with rails  (1 + 1 TRIXIE (rail for second step))   Home Equipment Walker;Cane   Prior Function   Level of [place] Independent with ADLs;Independent with functional mobility;Independent with IADLS   Lives With Daughter   Receives Help From Family   IADLs Independent with driving;Independent with meal prep;Independent with medication management   Falls in the last 6 months 0   Vocational Retired   General   Additional Pertinent History Pt is a 74 year-old male who was admitted to the hospital today (12/17/24) now seen POD #0 s/p L DA HEENA   Family/Caregiver Present Yes  (son at bedside throughout session)   Cognition   Overall Cognitive Status WFL   Arousal/Participation Cooperative   Orientation Level Oriented X4   Following Commands Follows multistep commands with increased time or repetition   RLE Assessment   RLE Assessment WFL   LLE Assessment   LLE Assessment   (Hip/Knee at least 3/5  ; Ankle WFL)   Bed Mobility   Supine to Sit 4  Minimal assistance   Additional items Assist x 1;Verbal cues;Increased time required;HOB elevated   Sit to Supine Unable to  assess   Additional Comments transferred to bedside chair   Transfers   Sit to Stand 4  Minimal assistance   Additional items Assist x 1;Verbal cues   Stand to Sit 5  Supervision   Additional items Assist x 1;Verbal cues;Increased time required;Armrests   Ambulation/Elevation   Gait pattern Foward flexed;Short stride;Step to;Step through pattern  (decreased gait speed ; step to progressing to step through gait pattern)   Gait Assistance 4  Minimal assist  (progressing to supervision)   Additional items Assist x 1;Verbal cues   Assistive Device Rolling walker   Distance 120 feet with change of direction   Stair Management Assistance Not tested   Ambulation/Elevation Additional Comments Pt reports no lightheaded/dizziness after ambulating - BP assessed 150/82   Balance   Static Sitting Fair +   Dynamic Sitting Fair   Static Standing Fair   Dynamic Standing Fair -   Ambulatory Fair -  (RW)   Activity Tolerance   Activity Tolerance Patient tolerated treatment well;Patient limited by pain   Nurse Made Aware yes TATA Del Toro/SUN Lewis   Assessment   Prognosis Good   Problem List Decreased strength;Decreased range of motion;Decreased endurance;Impaired balance;Decreased mobility;Pain;Decreased skin integrity   Assessment Patient seen for Physical Therapy evaluation. Patient admitted with Status post total replacement of left hip.  Comorbidities affecting patient's physical performance include: arthritis, hypertension, hypothyroid, hyperlipidemia, osteoarthritis, and vertigo.  Personal factors affecting patient at time of initial evaluation include: lives in 1 story house, ambulating with assistive device, stairs to enter home, inability to navigate community distances, inability to navigate level surfaces without external assistance, and inability to perform IADLS . Prior to admission, patient was independent with functional mobility without assistive device, independent with ADLS, independent with IADLS, living with  daughter in a 1 level home with 2 steps to enter, ambulating household distance, and ambulating community distances.  Please find objective findings from Physical Therapy assessment regarding body systems outlined above with impairments and limitations including weakness, decreased ROM, impaired balance, decreased endurance, gait deviations, pain, decreased activity tolerance, decreased functional mobility tolerance, fall risk, and decreased skin integrity.  The Barthel Index was used as a functional outcome tool presenting with a score of Barthel Index Score: 65 today indicating moderate limitations of functional mobility and ADLS.  Patient's clinical presentation is currently unstable/unpredictable as seen in patient's presentation of changing level of pain, increased fall risk, new onset of impairment of functional mobility, decreased endurance, and new onset of weakness. Pt would benefit from continued Physical Therapy treatment to address deficits as defined above and maximize level of functional mobility. As demonstrated by objective findings, the assigned level of complexity for this evaluation is high.The patient's -[facility] Basic Mobility Inpatient Short Form Raw Score is 18. A Raw score of greater than 16 suggests the patient may benefit from discharge to home. Please also refer to the recommendation of the Physical Therapist for safe discharge planning.   Goals   Patient Goals to decrease L hip pain and improve ability to walk   STG Expiration Date 12/24/24   Short Term Goal #1 Pt will be I with HEP ; Pt will perform bed mobility/transfers IND ; LLE strength will improve by 1/2 grade to improve tolerance to functional mobility ; Pt will ambulate x 250 feet Mod I with RW ; Pt will negotiate x 2 steps Mod I with RW to enter/exit home ; AMPAC score will improve >20/24 to demonstrate improved functional independence   LTG Expiration Date 12/31/24   Long Term Goal #1 LLE strength will improve by 1 grade to  improve tolerance to functional mobility ; Pt will ambulate x 500 feet Mod I with least restrictive device ; Pt will negotiate x 2 steps Mod I with least restrictive device to enter/exit home ; AMPAC score will improve >22/24 to demonstrate improved functional independence   Plan   Treatment/Interventions Functional transfer training;LE strengthening/ROM;Therapeutic exercise;Endurance training;Bed mobility;Gait training;Spoke to nursing   PT Frequency Twice a day   Discharge Recommendation   Rehab Resource Intensity Level, PT III (Minimum Resource Intensity)   AM-[facility] Basic Mobility Inpatient   Turning in Flat Bed Without Bedrails 3   Lying on Back to Sitting on Edge of Flat Bed Without Bedrails 3   Moving Bed to Chair 3   Standing Up From Chair Using Arms 3   Walk in Room 3   Climb 3-5 Stairs With Railing 3   Basic Mobility Inpatient Raw Score 18   Basic Mobility Standardized Score 41.05   [facility] Highest Level Of Mobility   -HLM Goal 6: Walk 10 steps or more   -HLM Achieved 7: Walk 25 feet or more   Barthel Index   Feeding 10   Bathing 0   Grooming Score 5   Dressing Score 5   Bladder Score 10   Bowels Score 10   Toilet Use Score 5   Transfers (Bed/Chair) Score 10   Mobility (Level Surface) Score 10   Stairs Score 0   Barthel Index Score 65   Additional Treatment Session   Start Time 1500   End Time 1510   Treatment Assessment S: Pt agreeable to PT session this afternoon. O/A: Pt able to perform exercise as mentioned above with intermittent assist to improve form/ROM. HEP provided - to review during next follow-up session. Repositioned in bedside chair with all needs within reach. P: pt will benefit from skilled PT to address deficits to maximize IND for safe d/c with plan to start OP PT later this week   Exercises   THR Supine;Sitting;Left  (Ankle pumps, quad/glute sets, heel slides, hip abd, LAQ x 5-10 reps L)   End of Consult   Patient Position at End of Consult Bedside chair;All needs within reach    Licensure   [place] License Number  Coni [place] [id number]

## 2024-12-17 NOTE — ANESTHESIA PREPROCEDURE EVALUATION
Procedure:  ARTHROPLASTY HIP TOTAL ANTERIOR,NAVIGATED - LEFT - SAME DAY (Left: Hip)    Relevant Problems   ANESTHESIA (within normal limits)      CARDIO   (+) Benign essential hypertension   (+) Mixed hyperlipidemia      ENDO   (+) Hypothyroidism      /RENAL   (+) Benign prostatic hyperplasia without lower urinary tract symptoms      MUSCULOSKELETAL   (+) Osteoarthritis of left hip      PULMONARY   (+) RYANNE on CPAP        Physical Exam    Airway    Mallampati score: II  TM Distance: >3 FB  Neck ROM: full     Dental   No notable dental hx     Cardiovascular  Rhythm: regular, Rate: normal    Pulmonary   Breath sounds clear to auscultation    Other Findings        Anesthesia Plan  ASA Score- 3     Anesthesia Type- spinal with ASA Monitors.         Additional Monitors:     Airway Plan:            Plan Factors-Exercise tolerance (METS): >4 METS.    Chart reviewed. EKG reviewed.  Existing labs reviewed. Patient summary reviewed.    Patient is not a current smoker.              Induction-     Postoperative Plan-         Informed Consent- Anesthetic plan and risks discussed with patient.  I personally reviewed this patient with the CRNA. Discussed and agreed on the Anesthesia Plan with the CRNA..

## 2024-12-17 NOTE — TELEPHONE ENCOUNTER
Caller: Good short     Doctor: Amanda     Reason for call: Asked for a new PT script   [phone]

## 2024-12-17 NOTE — INTERVAL H&P NOTE
H&P reviewed. After examining the patient I find no changes in the patients condition since the H&P had been written.  In addition the patient was seen and examined this morning.  The patient states his activity related left hip and groin pain continues to be significant and severe radiating diffusely throughout his left groin.  Please see today's exam below.  Patient denies any recent fever, chills, nausea, vomiting, headache, chest pain, trouble breathing.  The patient has been seen and cleared by his medical subspecialist in preparation for the procedure.  The patient will be a good candidate for aspirin postoperatively for DVT prophylaxis.  The patient be a good candidate for outpatient physical therapy following his discharge from the hospital today.  All questions addressed this morning.  He is OR for direct anterior left total arthroplasty    Vitals:    12/17/24 0722   BP: 162/74   Pulse: 64   Resp: 18   Temp: 97.9 °F (36.6 °C)   SpO2: 98%     Left Hip Exam      Tenderness   The patient is experiencing tenderness in the anterior, posterior and lateral.     Range of Motion   Abduction:  50   Adduction:  10   Extension:  0   Flexion:  90   External rotation:  30   Internal rotation: 0      Muscle Strength   Abduction: 5/5   Adduction: 5/5   Flexion: 5/5      Tests   ALEXANDRA: positive     Other   Erythema: absent  Scars: absent  Sensation: normal  Pulse: present     Comments:  Skin intact slight overhanging abdominal pelvis with protuberant abdomen    Aubrey Patel D.O.  Division of Adult Reconstruction  Department of Orthopaedic Surgery  [facility]

## 2024-12-17 NOTE — ANESTHESIA POSTPROCEDURE EVALUATION
Post-Op Assessment Note    CV Status:  Stable  Pain Score: 1    Pain management: adequate       Mental Status:  Alert and awake   Hydration Status:  Euvolemic and stable   PONV Controlled:  Controlled   Airway Patency:  Patent     Post Op Vitals Reviewed: Yes    No anethesia notable event occurred.    Staff: Anesthesiologist           Last Filed PACU Vitals:  Vitals Value Taken Time   Temp     Pulse     BP     Resp     SpO2         Modified Maylin:  No data recorded

## 2024-12-17 NOTE — ANESTHESIA PROCEDURE NOTES
Spinal Block    Patient location during procedure: OR  Start time: 12/17/2024 9:24 AM  Reason for block: procedure for pain and at surgeon's request  Staffing  Performed by: David Bowers MD  Authorized by: David Bowers MD    Preanesthetic Checklist  Completed: patient identified, IV checked, site marked, risks and benefits discussed, surgical consent, monitors and equipment checked, pre-op evaluation and timeout performed  Spinal Block  Patient position: sitting  Prep: ChloraPrep and site prepped and draped  Patient monitoring: frequent blood pressure checks, continuous pulse ox and heart rate  Approach: midline  Location: L3-4  Needle  Needle type: Pencan   Needle gauge: 24 G  Needle length: 4 in  Assessment  Sensory level: T10  Injection Assessment:  negative aspiration for heme, no paresthesia on injection and positive aspiration for clear CSF.  Post-procedure:  site cleaned

## 2024-12-17 NOTE — ANESTHESIA PROCEDURE NOTES
Peripheral Block    Patient location during procedure: holding area  Start time: 12/17/2024 12:29 PM  Reason for block: at surgeon's request and post-op pain management  Staffing  Performed by: David Bowers MD  Authorized by: David Bowers MD    Preanesthetic Checklist  Completed: patient identified, IV checked, site marked, risks and benefits discussed, surgical consent, monitors and equipment checked, pre-op evaluation and timeout performed  Peripheral Block  Prep: ChloraPrep  Patient monitoring: frequent blood pressure checks, continuous pulse oximetry and heart rate  Block type: PENG  Laterality: left  Injection technique: single-shot  Procedures: ultrasound guided, Ultrasound guidance required for the procedure to increase accuracy and safety of medication placement and decrease risk of complications.  Ultrasound permanent image saved  bupivacaine (PF) (MARCAINE) 0.25 % injection 20 mL - Perineural   10 mL - 12/17/2024 12:30:00 PM  bupivacaine liposomal (EXPAREL) 1.3 % injection 20 mL - Perineural   10 mL - 12/17/2024 12:30:00 PM  Needle  Needle type: Stimuplex   Needle gauge: 20 G  Needle length: 4 in  Needle localization: anatomical landmarks and ultrasound guidance  Needle insertion depth: 7 cm  Assessment  Injection assessment: incremental injection, frequent aspiration, injected with ease, negative aspiration, negative for heart rate change, no paresthesia on injection, no symptoms of intraneural/intravenous injection and needle tip visualized at all times  Paresthesia pain: none  Post-procedure:  site cleaned  patient tolerated the procedure well with no immediate complications

## 2024-12-17 NOTE — ANESTHESIA POSTPROCEDURE EVALUATION
Post-Op Assessment Note    CV Status:  Stable  Pain Score: 0    Pain management: adequate       Mental Status:  Sleepy and arousable   Hydration Status:  Stable   PONV Controlled:  Controlled   Airway Patency:  Patent and adequate     Post Op Vitals Reviewed: Yes    No anethesia notable event occurred.    Staff: CRNA           Last Filed PACU Vitals:  Vitals Value Taken Time   Temp     Pulse     BP     Resp     SpO2         Modified Maylin:  No data recorded

## 2024-12-18 VITALS
SYSTOLIC BLOOD PRESSURE: 132 MMHG | HEIGHT: 68 IN | RESPIRATION RATE: 18 BRPM | OXYGEN SATURATION: 100 % | HEART RATE: 71 BPM | DIASTOLIC BLOOD PRESSURE: 64 MMHG | TEMPERATURE: 98.8 F | WEIGHT: 235.67 LBS | BODY MASS INDEX: 35.72 KG/M2

## 2024-12-18 LAB
ANION GAP SERPL CALCULATED.3IONS-SCNC: 9 MMOL/L (ref 4–13)
BUN SERPL-MCNC: 16 MG/DL (ref 5–25)
CALCIUM SERPL-MCNC: 8.6 MG/DL (ref 8.4–10.2)
CHLORIDE SERPL-SCNC: 101 MMOL/L (ref 96–108)
CO2 SERPL-SCNC: 26 MMOL/L (ref 21–32)
CREAT SERPL-MCNC: 0.75 MG/DL (ref 0.6–1.3)
ERYTHROCYTE [DISTWIDTH] IN BLOOD BY AUTOMATED COUNT: 12.5 % (ref 11.6–15.1)
GFR SERPL CREATININE-BSD FRML MDRD: 91 ML/MIN/1.73SQ M
GLUCOSE P FAST SERPL-MCNC: 136 MG/DL (ref 65–99)
GLUCOSE SERPL-MCNC: 136 MG/DL (ref 65–140)
HCT VFR BLD AUTO: 37.5 % (ref 36.5–49.3)
HGB BLD-MCNC: 12.7 G/DL (ref 12–17)
MCH RBC QN AUTO: 32.3 PG (ref 26.8–34.3)
MCHC RBC AUTO-ENTMCNC: 33.9 G/DL (ref 31.4–37.4)
MCV RBC AUTO: 95 FL (ref 82–98)
PLATELET # BLD AUTO: 175 THOUSANDS/UL (ref 149–390)
PMV BLD AUTO: 9.1 FL (ref 8.9–12.7)
POTASSIUM SERPL-SCNC: 3.6 MMOL/L (ref 3.5–5.3)
RBC # BLD AUTO: 3.93 MILLION/UL (ref 3.88–5.62)
SODIUM SERPL-SCNC: 136 MMOL/L (ref 135–147)
WBC # BLD AUTO: 9.8 THOUSAND/UL (ref 4.31–10.16)

## 2024-12-18 PROCEDURE — 97167 OT EVAL HIGH COMPLEX 60 MIN: CPT

## 2024-12-18 PROCEDURE — 97530 THERAPEUTIC ACTIVITIES: CPT

## 2024-12-18 PROCEDURE — 99024 POSTOP FOLLOW-UP VISIT: CPT | Performed by: ORTHOPAEDIC SURGERY

## 2024-12-18 PROCEDURE — 97110 THERAPEUTIC EXERCISES: CPT

## 2024-12-18 PROCEDURE — 80048 BASIC METABOLIC PNL TOTAL CA: CPT | Performed by: PHYSICIAN ASSISTANT

## 2024-12-18 PROCEDURE — 85027 COMPLETE CBC AUTOMATED: CPT | Performed by: PHYSICIAN ASSISTANT

## 2024-12-18 RX ADMIN — MELOXICAM 15 MG: 7.5 TABLET ORAL at 09:27

## 2024-12-18 RX ADMIN — ATORVASTATIN CALCIUM 10 MG: 10 TABLET, FILM COATED ORAL at 09:27

## 2024-12-18 RX ADMIN — GABAPENTIN 200 MG: 100 CAPSULE ORAL at 09:27

## 2024-12-18 RX ADMIN — ASPIRIN 325 MG: 325 TABLET ORAL at 09:27

## 2024-12-18 RX ADMIN — OXYCODONE HYDROCHLORIDE 10 MG: 10 TABLET ORAL at 07:52

## 2024-12-18 RX ADMIN — LEVOTHYROXINE SODIUM 75 MCG: 75 TABLET ORAL at 05:17

## 2024-12-18 RX ADMIN — DOCUSATE SODIUM 100 MG: 100 CAPSULE, LIQUID FILLED ORAL at 09:27

## 2024-12-18 RX ADMIN — ACETAMINOPHEN 975 MG: 325 TABLET ORAL at 05:17

## 2024-12-18 RX ADMIN — IRON SUCROSE 300 MG: 20 INJECTION, SOLUTION INTRAVENOUS at 09:27

## 2024-12-18 RX ADMIN — DEXAMETHASONE SODIUM PHOSPHATE 10 MG: 4 INJECTION, SOLUTION INTRAMUSCULAR; INTRAVENOUS at 11:44

## 2024-12-18 RX ADMIN — SODIUM CHLORIDE, SODIUM LACTATE, POTASSIUM CHLORIDE, AND CALCIUM CHLORIDE 75 ML/HR: .6; .31; .03; .02 INJECTION, SOLUTION INTRAVENOUS at 08:18

## 2024-12-18 RX ADMIN — PANTOPRAZOLE SODIUM 40 MG: 40 TABLET, DELAYED RELEASE ORAL at 05:18

## 2024-12-18 NOTE — PLAN OF CARE
Problem: MUSCULOSKELETAL - ADULT  Goal: Maintain or return mobility to safest level of function  Description: INTERVENTIONS:  - Assess patient's ability to carry out ADLs; assess patient's baseline for ADL function and identify physical deficits which impact ability to perform ADLs (bathing, care of mouth/teeth, toileting, grooming, dressing, etc.)  - Assess/evaluate cause of self-care deficits   - Assess range of motion  - Assess patient's mobility  - Assess patient's need for assistive devices and provide as appropriate  - Encourage maximum independence but intervene and supervise when necessary  - Involve family in performance of ADLs  - Assess for home care needs following discharge   - Consider OT consult to assist with ADL evaluation and planning for discharge  - Provide patient education as appropriate  12/17/2024 2313 by Chiqui Rockwell RN  Outcome: Progressing  12/17/2024 2312 by Chiqui Rockwell RN  Outcome: Progressing  Goal: Maintain proper alignment of affected body part  Description: INTERVENTIONS:  - Support, maintain and protect limb and body alignment  - Provide patient/ family with appropriate education  12/17/2024 2313 by Chiqui Rockwell RN  Outcome: Progressing  12/17/2024 2312 by Chiqui Rockwell RN  Outcome: Progressing     Problem: PAIN - ADULT  Goal: Verbalizes/displays adequate comfort level or baseline comfort level  Description: Interventions:  - Encourage patient to monitor pain and request assistance  - Assess pain using appropriate pain scale  - Administer analgesics based on type and severity of pain and evaluate response  - Implement non-pharmacological measures as appropriate and evaluate response  - Consider cultural and social influences on pain and pain management  - Notify physician/advanced practitioner if interventions unsuccessful or patient reports new pain  Outcome: Progressing     Problem: INFECTION - ADULT  Goal: Absence or prevention of  progression during hospitalization  Description: INTERVENTIONS:  - Assess and monitor for signs and symptoms of infection  - Monitor lab/diagnostic results  - Monitor all insertion sites, i.e. indwelling lines, tubes, and drains  - Monitor endotracheal if appropriate and nasal secretions for changes in amount and color  - [place] appropriate cooling/warming therapies per order  - Administer medications as ordered  - Instruct and encourage patient and family to use good hand hygiene technique  - Identify and instruct in appropriate isolation precautions for identified infection/condition  Outcome: Progressing     Problem: SAFETY ADULT  Goal: Patient will remain free of falls  Description: INTERVENTIONS:  - Educate patient/family on patient safety including physical limitations  - Instruct patient to call for assistance with activity   - Consult OT/PT to assist with strengthening/mobility   - Keep Call bell within reach  - Keep bed low and locked with side rails adjusted as appropriate  - Keep care items and personal belongings within reach  - Initiate and maintain comfort rounds  - Make Fall Risk Sign visible to staff  - Offer Toileting every 2 Hours, in advance of need  - Obtain necessary fall risk management equipment:   - Apply yellow socks and bracelet for high fall risk patients  - Consider moving patient to room near nurses station  Outcome: Progressing  Goal: Maintain or return to baseline ADL function  Description: INTERVENTIONS:  -  Assess patient's ability to carry out ADLs; assess patient's baseline for ADL function and identify physical deficits which impact ability to perform ADLs (bathing, care of mouth/teeth, toileting, grooming, dressing, etc.)  - Assess/evaluate cause of self-care deficits   - Assess range of motion  - Assess patient's mobility; develop plan if impaired  - Assess patient's need for assistive devices and provide as appropriate  - Encourage maximum independence but intervene and  supervise when necessary  - Involve family in performance of ADLs  - Assess for home care needs following discharge   - Consider OT consult to assist with ADL evaluation and planning for discharge  - Provide patient education as appropriate  Outcome: Progressing  Goal: Maintains/Returns to pre admission functional level  Description: INTERVENTIONS:  - Perform AM-PAC 6 Click Basic Mobility/ Daily Activity assessment daily.  - Set and communicate daily mobility goal to care team and patient/family/caregiver.   - Collaborate with rehabilitation services on mobility goals if consulted  - Perform Range of Motion 3 times a day.  - Reposition patient every 2 hours.  - Dangle patient 3 times a day  - Stand patient 3 times a day  - Ambulate patient 3 times a day  - Out of bed to chair 3 times a day   - Out of bed for meals 3 times a day  - Out of bed for toileting  - Record patient progress and toleration of activity level   Outcome: Progressing     Problem: DISCHARGE PLANNING  Goal: Discharge to home or other facility with appropriate resources  Description: INTERVENTIONS:  - Identify barriers to discharge w/patient and caregiver  - Arrange for needed discharge resources and transportation as appropriate  - Identify discharge learning needs (meds, wound care, etc.)  - Arrange for interpretive services to assist at discharge as needed  - Refer to Case Management Department for coordinating discharge planning if the patient needs post-hospital services based on physician/advanced practitioner order or complex needs related to functional status, cognitive ability, or social support system  Outcome: Progressing     Problem: Knowledge Deficit  Goal: Patient/family/caregiver demonstrates understanding of disease process, treatment plan, medications, and discharge instructions  Description: Complete learning assessment and assess knowledge base.  Interventions:  - Provide teaching at level of understanding  - Provide teaching via  preferred learning methods  Outcome: Progressing

## 2024-12-18 NOTE — PHYSICAL THERAPY NOTE
PT TREATMENT       12/18/24 1052   PT Last Visit   PT Visit Date 12/18/24   Note Type   Note Type BID visit/treatment   Pain Assessment   Pain Assessment Tool 0-10   Pain Score 5   Pain Location/Orientation Orientation: Left;Location: Hip   Pain Onset/Description Descriptor: Burning;Onset: Ongoing   Effect of Pain on Daily Activities limits mobility/activity tolerance   Patient's Stated Pain Goal No pain   Hospital Pain Intervention(s) Repositioned;Ambulation/increased activity;Cold applied   Multiple Pain Sites No   Restrictions/Precautions   Weight Bearing Precautions Per Order Yes   LLE Weight Bearing Per Order WBAT   Other Precautions Fall Risk;Pain   General   Chart Reviewed Yes   Family/Caregiver Present No   Cognition   Overall Cognitive Status WFL   Arousal/Participation Cooperative   Orientation Level Oriented X4   Following Commands Follows all commands and directions without difficulty   Subjective   Subjective Pt agreeable to PT session   Bed Mobility   Supine to Sit Unable to assess   Sit to Supine Unable to assess   Additional Comments Received sitting in bedside chair   Transfers   Sit to Stand 6  Modified independent   Additional items Armrests   Stand to Sit 6  Modified independent   Additional items Armrests   Ambulation/Elevation   Gait pattern Short stride;Foward flexed;Step through pattern  (decreased gait speed)   Gait Assistance 5  Supervision   Additional items Verbal cues   Assistive Device Rolling walker   Distance 150 feet with change of direction   Stair Management Assistance 5  Supervision   Additional items Assist x 1;Verbal cues   Stair Management Technique Two rails;Step to pattern   Number of Stairs 4   Balance   Static Sitting Good   Dynamic Sitting Fair +   Static Standing Fair   Dynamic Standing Fair   Ambulatory Fair   Activity Tolerance   Activity Tolerance Patient tolerated treatment well;Patient limited by pain   Nurse Made Aware yes TATA Segura   Assessment   Prognosis Good    Problem List Decreased strength;Decreased range of motion;Decreased endurance;Impaired balance;Decreased mobility;Pain;Decreased skin integrity   Assessment Pt seen for PT session this AM. Pt continues to make good progress towards IP PT goals. Pt demonstrates ability to ambulate + negotiate steps with supervision progressing to Mod I. HEP reviewed + pt verbalized understanding. Pt safe for d/c home with plan to start OP PT later this week.  The patient's AM-PAC Basic Mobility Inpatient Short Form Raw Score is 19. A Raw score of greater than 16 suggests the patient may benefit from discharge to home. Please also refer to the recommendation of the Physical Therapist for safe discharge planning.     Goals   Patient Goals to get better   Plan   Treatment/Interventions Functional transfer training;LE strengthening/ROM;Therapeutic exercise;Endurance training;Bed mobility;Gait training;Spoke to nursing   Progress Progressing toward goals   PT Frequency Twice a day   Discharge Recommendation   Rehab Resource Intensity Level, PT III (Minimum Resource Intensity)   AM-PAC Basic Mobility Inpatient   Turning in Flat Bed Without Bedrails 3   Lying on Back to Sitting on Edge of Flat Bed Without Bedrails 3   Moving Bed to Chair 3   Standing Up From Chair Using Arms 4   Walk in Room 3   Climb 3-5 Stairs With Railing 3   Basic Mobility Inpatient Raw Score 19   Basic Mobility Standardized Score 42.48   [facility] Highest Level Of Mobility   -HLM Goal 6: Walk 10 steps or more   -HLM Achieved 8: Walk 250 feet ot more   Education   Education Provided Mobility training;Home exercise program;Assistive device   Patient Demonstrates verbal understanding   End of Consult   Patient Position at End of Consult Bedside chair;All needs within reach;Bed/Chair alarm activated   Licensure   [place] License Number  Coni Claire [id number]

## 2024-12-18 NOTE — PLAN OF CARE
Problem: PAIN - ADULT  Goal: Verbalizes/displays adequate comfort level or baseline comfort level  Description: Interventions:  - Encourage patient to monitor pain and request assistance  - Assess pain using appropriate pain scale  - Administer analgesics based on type and severity of pain and evaluate response  - Implement non-pharmacological measures as appropriate and evaluate response  - Consider cultural and social influences on pain and pain management  - Notify physician/advanced practitioner if interventions unsuccessful or patient reports new pain  Outcome: Progressing     Problem: MUSCULOSKELETAL - ADULT  Goal: Maintain or return mobility to safest level of function  Description: INTERVENTIONS:  - Assess patient's ability to carry out ADLs; assess patient's baseline for ADL function and identify physical deficits which impact ability to perform ADLs (bathing, care of mouth/teeth, toileting, grooming, dressing, etc.)  - Assess/evaluate cause of self-care deficits   - Assess range of motion  - Assess patient's mobility  - Assess patient's need for assistive devices and provide as appropriate  - Encourage maximum independence but intervene and supervise when necessary  - Involve family in performance of ADLs  - Assess for home care needs following discharge   - Consider OT consult to assist with ADL evaluation and planning for discharge  - Provide patient education as appropriate  Outcome: Progressing     Problem: SAFETY ADULT  Goal: Maintain or return to baseline ADL function  Description: INTERVENTIONS:  -  Assess patient's ability to carry out ADLs; assess patient's baseline for ADL function and identify physical deficits which impact ability to perform ADLs (bathing, care of mouth/teeth, toileting, grooming, dressing, etc.)  - Assess/evaluate cause of self-care deficits   - Assess range of motion  - Assess patient's mobility; develop plan if impaired  - Assess patient's need for assistive devices and  provide as appropriate  - Encourage maximum independence but intervene and supervise when necessary  - Involve family in performance of ADLs  - Assess for home care needs following discharge   - Consider OT consult to assist with ADL evaluation and planning for discharge  - Provide patient education as appropriate  Outcome: Progressing

## 2024-12-18 NOTE — PHYSICAL THERAPY NOTE
PT TREATMENT       12/18/24 0850   PT Last Visit   PT Visit Date 12/18/24   Note Type   Note Type BID visit/treatment   Pain Assessment   Pain Assessment Tool 0-10   Pain Location/Orientation Orientation: Left;Location: Hip   Pain Onset/Description Onset: Ongoing;Descriptor: Sore;Descriptor: Burning   Effect of Pain on Daily Activities limits mobility/activity tolerance   Patient's Stated Pain Goal No pain   Hospital Pain Intervention(s) Repositioned;Ambulation/increased activity   Restrictions/Precautions   Weight Bearing Precautions Per Order Yes   LLE Weight Bearing Per Order WBAT   Other Precautions Fall Risk;Pain   General   Chart Reviewed Yes   Family/Caregiver Present No   Cognition   Overall Cognitive Status WFL   Arousal/Participation Cooperative   Orientation Level Oriented X4   Following Commands Follows all commands and directions without difficulty   Subjective   Subjective Pt agreeable to PT session this AM. Pt report moderate to severe burning pain at L hip   Bed Mobility   Supine to Sit 4  Minimal assistance   Additional items Assist x 1;Verbal cues;HOB elevated   Sit to Supine Unable to assess   Additional Comments transferred to bedside chair   Transfers   Sit to Stand 5  Supervision   Additional items Assist x 1;Verbal cues   Stand to Sit 5  Supervision   Additional items Assist x 1;Verbal cues;Armrests   Toilet transfer 5  Supervision   Additional items Verbal cues;Standard toilet  (able to stand at standard toilet with supervision + grab bar to urinate)   Ambulation/Elevation   Gait pattern Foward flexed;Short stride;Step through pattern  (decreased gait speed)   Gait Assistance 5  Supervision   Additional items Verbal cues   Assistive Device Rolling walker   Distance 150 feet with change of direction   Stair Management Assistance 4  Minimal assist  (progressing to supervision)   Additional items Assist x 1;Verbal cues   Stair Management Technique Two rails;Step to pattern   Number of Stairs  4   Balance   Static Sitting Fair +   Dynamic Sitting Fair   Static Standing Fair   Dynamic Standing Fair   Ambulatory Fair  (RW)   Activity Tolerance   Activity Tolerance Patient tolerated treatment well;Patient limited by pain   Nurse Made Aware yes RN Imelda   Exercises   THR Supine;Sitting;Left  (Ankle pumps, quad/glute sets, heel slides, hip abd, LAQ x 10 reps L)   Assessment   Prognosis Good   Problem List Decreased strength;Decreased range of motion;Decreased endurance;Impaired balance;Decreased mobility;Pain;Decreased skin integrity   Assessment Pt seen for PT session this AM. Pt making good progress towards IP PT goals. Able to progress ambulation x increased distance with decreased level of assist using RW. Able to negotiate x 4 steps with step to pattern with intermittent verbal cues for sequencing with good response. Able to perform exercise with good response ; HEP reviewed + pt verbalized understanding.  The patient's AM-PAC Basic Mobility Inpatient Short Form Raw Score is 18. A Raw score of greater than 16 suggests the patient may benefit from discharge to home. Please also refer to the recommendation of the Physical Therapist for safe discharge planning.     Goals   Patient Goals to decrease L hip pain, improve ability to walk   Plan   Treatment/Interventions Functional transfer training;LE strengthening/ROM;Endurance training;Therapeutic exercise;Bed mobility;Gait training;Spoke to nursing   Progress Progressing toward goals   PT Frequency Twice a day   Discharge Recommendation   Rehab Resource Intensity Level, PT III (Minimum Resource Intensity)   AM-PAC Basic Mobility Inpatient   Turning in Flat Bed Without Bedrails 3   Lying on Back to Sitting on Edge of Flat Bed Without Bedrails 3   Moving Bed to Chair 3   Standing Up From Chair Using Arms 3   Walk in Room 3   Climb 3-5 Stairs With Railing 3   Basic Mobility Inpatient Raw Score 18   Basic Mobility Standardized Score 41.05   [facility]  Level Of Mobility   JH-HLM Goal 6: Walk 10 steps or more   JH-HLM Achieved 7: Walk 25 feet or more   Education   Education Provided Mobility training;Home exercise program;Assistive device   Patient Demonstrates verbal understanding   End of Consult   Patient Position at End of Consult Bedside chair;All needs within reach   Licensure   [place] License Number  Coni Claire [id number]

## 2024-12-18 NOTE — PLAN OF CARE
Problem: OCCUPATIONAL THERAPY ADULT  Goal: Performs self-care activities at highest level of function for planned discharge setting.  See evaluation for individualized goals.  Description: Treatment Interventions: ADL retraining, Functional transfer training, Endurance training, Patient/family training, Equipment evaluation/education, Energy conservation, Activityengagement          See flowsheet documentation for full assessment, interventions and recommendations.   Note: Limitation: Decreased ADL status, Decreased endurance, Decreased high-level ADLs, Decreased self-care trans (impaired pain, standing tolerance, balance, anticipated L LE ROM /strength deficits)     Assessment: Pt is a 73yo male s/p ant L HEENA on 12/17/24 w/ Dr. Patel. PT is WBAT L LE. Significant PMH impacting his occupational performance includes RYANNE on CPAP, obesity, L ankle impingement syndrome (2022), s/p carpal tunnel release, R rotator cuff tear (2021), hx vertigo, osteoarthritis L hip. Personal and environmental factors supporting performance includes independent at baseline, maintains first floor set- up, supportive family; barriers include pain, difficulty completing IADLs. Pt reports I w/ ADL/ IADL at baseline home in 2 SH w/ 1 st floor set- up. Pt reports that his daughter lives w/ him. Upon eval, pt alert and oriented. Able to follow directions and communicate wants / needs. Pt required mod I grooming / UB bathing in stance at sink w/ fair balance, mod I UBD seated for + time, min A LBD, S to complete commode transfer (over toilet in bathroom), S sit <> stand from recliner chair and S using RW for functional mobility. Pt is completing ADLs below baseline level of I due to anticipated L LE ROM / strength deficits, pain, and impaired standing /activity tolerance. Pt would benefit from OT in acute care to max I w/ ADLs, achieve highest level of function. No post acute OT rehab needs when medically stable. From an OT perspective,  appropriate to DC Home using RW w/ OPPT. Will continue to follow     Rehab Resource Intensity Level, OT: No post-acute rehabilitation needs

## 2024-12-18 NOTE — OCCUPATIONAL THERAPY NOTE
"    Occupational Therapy Evaluation     Patient Name: Pan Gaming  Today's Date: 12/18/2024  Problem List  Principal Problem:    Status post total replacement of left hip    Past Medical History  Past Medical History:   Diagnosis Date    Arthritis     DJD    CPAP (continuous positive airway pressure) dependence     Disease of thyroid gland     Dizziness     Ear problems     Hypertension     Hypothyroidism     Sleep apnea, obstructive     Sleep difficulties     Wears glasses     for reading     Past Surgical History  Past Surgical History:   Procedure Laterality Date    APPENDECTOMY      COLONOSCOPY      FOOT SURGERY Left     scar tissue removal from previous injury    INGUINAL HERNIA REPAIR Left     KNEE ARTHROSCOPY Bilateral     FL ARTHROCENTESIS ASPIR&/INJ MAJOR JT/BURSA W/O US Left 07/19/2024    Procedure: LEFT INTRA-ARTICULAR HIP INJECTION;  Surgeon: Sergey Brewer MD;  Location: [facility] MAIN OR;  Service: Pain Management     FL ARTHRP ACETBLR/PROX FEM PROSTC AGRFT/ALGRFT Left 12/17/2024    Procedure: ARTHROPLASTY HIP TOTAL ANTERIOR,NAVIGATED - LEFT - SAME DAY;  Surgeon: Aubrey Patel DO;  Location: [facility] MAIN OR;  Service: Orthopedics    ROTATOR CUFF REPAIR Bilateral     TONSILLECTOMY           12/18/24 0908   OT Last Visit   OT Visit Date 12/18/24  (Wednesday)   Note Type   Note type Evaluation  (Eval 7759-3116)   Additional Comments Identified pt by full name and birthdate   Pain Assessment   Pain Assessment Tool 0-10   Pain Score 7   Pain Location/Orientation Orientation: Left;Location: Hip;Location: Leg   Pain Onset/Description Descriptor: Burning  (\"when I first get up\")   Effect of Pain on Daily Activities limits pace and activity tolerance during ADLs   Hospital Pain Intervention(s) Cold applied;Repositioned;Ambulation/increased activity;Emotional support;Elevated  (seated OOB in chair post eval w/ LE elevated on leg rests w/ cold pack)   Restrictions/Precautions   Weight Bearing Precautions Per Order " "Yes   LLE Weight Bearing Per Order WBAT  (s/p estephania NAIK w/ Dr. Patel on 12/17/24)   Other Precautions WBS;Fall Risk;Pain   Home Living   Type of Home House   Home Layout Two level;Performs ADLs on one level;Able to live on main level with bedroom/bathroom;Stairs to enter with rails;Other (Comment)  (1+1+2 TRIXIE)   Bathroom Shower/Tub Walk-in shower   Bathroom Toilet Standard   Bathroom Equipment Commode;Other (Comment)  (commode over toilet in bathroom)   Bathroom Accessibility Accessible   Home Equipment Walker;Cane;Long-handled shoehorn   Additional Comments Pt reports that his daughter lives w/ him in a 2 SH w/ 1 st floor set- up   Prior Function   Level of [place] Independent with ADLs;Independent with functional mobility;Independent with IADLS   Lives With Daughter   Receives Help From Family   IADLs Independent with driving;Independent with meal prep;Independent with medication management  (daughter manages laundry which is upstairs on 2nd floor)   Falls in the last 6 months 0   Vocational Part time employment   Comments Pt reports I w/ ADLs / IADLs at baseline   Lifestyle   Autonomy Pt reports I w/ ADL/ IADL   Reciprocal Relationships Pt reports that his daughter lives w/ him. 4 grandchildren   Service to Others Pt reports retired and worked at the [employer] in [place], [place]. Continues to run construction company; part time   Intrinsic Gratification Enjoys watching sports (football, college basketball). Word searches   General   Additional Pertinent History Pt is s/p GEOFF NAIK w/ Dr. Patel on 12/17/24   Family/Caregiver Present No   Subjective   Subjective \"I like several quaterbacks. I like Oz Estrada and [place] [employer]\"   ADL   Where Assessed Chair  (vs in stance in bathroom at sink)   Eating Assistance 7  Independent   Eating Deficit Setup   Grooming Assistance 6  Modified Independent   Grooming Deficit Setup;Increased time to complete;Standing with assistive device  (cues intially for " "body position w/ in walker frame at sink)   UB Bathing Assistance 6  Modified Independent   UB Bathing Deficit Setup;Increased time to complete  (in stance at sink w/ fair balance)   LB Bathing Assistance Unable to assess  (anticipate S based on fxal obs skills, clinical judgement)   UB Dressing Assistance 6  Modified independent   UB Dressing Deficit Setup   LB Dressing Assistance 4  Minimal Assistance   LB Dressing Deficit Setup;Thread LLE into pants;Requires assistive device for steadying;Verbal cueing;Supervision/safety;Increased time to complete  (Educated pt on tech to complete LBD threading L LE first while seated. Demo understanding w/ min A)   Toileting Assistance  5  Supervision/Setup   Toileting Deficit Bedside commode;Setup  (commode over toilet)   Additional Comments Pt voided in stance to urinate prior to therapist arrival. Completed transfer to commode over toilet w/ S to simulate set- up at home. Pt reports commode is 22\" at home   Bed Mobility   Supine to Sit Unable to assess   Sit to Supine Unable to assess   Additional Comments Pt seated OOB in chair upon therapist arrival and post eval w/ needs met, call bell in reach   Transfers   Sit to Stand 5  Supervision   Additional items Assist x 1;Armrests;Verbal cues   Stand to Sit 5  Supervision   Additional items Assist x 1;Armrests;Increased time required;Verbal cues   Toilet transfer 5  Supervision   Additional items Assist x 1;Commode  (commode over toilet)   Car transfer   (Pt verbalized understanding car transfer tech)   Additional Comments Pt performed sit <> stand 3X w/ S   Functional Mobility   Functional Mobility 5  Supervision   Additional Comments to/ from bathroom   Additional items Rolling walker   Balance   Static Sitting Fair +   Dynamic Sitting Fair   Static Standing Fair   Ambulatory Fair   Activity Tolerance   Activity Tolerance Patient limited by pain;Patient tolerated treatment well   Medical Staff Made Aware spoke w/ PTConi "   Nurse Made Aware spoke w/ RNImelda Assessment   RUE Assessment WFL   RUE Strength   RUE Overall Strength Within Functional Limits - able to perform ADL tasks with strength   LUE Assessment   LUE Assessment WFL   LUE Strength   LUE Overall Strength Within Functional Limits - able to perform ADL tasks with strength   Hand Function   Gross Motor Coordination Functional   Fine Motor Coordination Functional   Cognition   Overall Cognitive Status WFL   Arousal/Participation Alert;Cooperative   Attention Within functional limits   Orientation Level Oriented X4   Memory Within functional limits   Following Commands Follows all commands and directions without difficulty   Comments Alert, oriented and agreeable to participate. Able to participate in conversation   Assessment   Limitation Decreased ADL status;Decreased endurance;Decreased high-level ADLs;Decreased self-care trans  (impaired pain, standing tolerance, balance, anticipated L LE ROM /strength deficits)   Assessment Pt is a 75yo male s/p ant L HEENA on 12/17/24 w/ Dr. Patel. PT is WBAT L LE. Significant PMH impacting his occupational performance includes RYANNE on CPAP, obesity, L ankle impingement syndrome (2022), s/p carpal tunnel release, R rotator cuff tear (2021), hx vertigo, osteoarthritis L hip. Personal and environmental factors supporting performance includes independent at baseline, maintains first floor set- up, supportive family; barriers include pain, difficulty completing IADLs. Pt reports I w/ ADL/ IADL at baseline home in 2 SH w/ 1 st floor set- up. Pt reports that his daughter lives w/ him. Upon eval, pt alert and oriented. Able to follow directions and communicate wants / needs. Pt required mod I grooming / UB bathing in stance at sink w/ fair balance, mod I UBD seated for + time, min A LBD, S to complete commode transfer (over toilet in bathroom), S sit <> stand from recliner chair and S using RW for functional mobility. Pt is completing  ADLs below baseline level of I due to anticipated L LE ROM / strength deficits, pain, and impaired standing /activity tolerance. Pt would benefit from OT in acute care to max I w/ ADLs, achieve highest level of function. No post acute OT rehab needs when medically stable. From an OT perspective, appropriate to DC Home using RW w/ OPPT. Will continue to follow   Goals   Patient Goals Pt stated that he would like to decreased L hip pain   Plan   Treatment Interventions ADL retraining;Functional transfer training;Endurance training;Patient/family training;Equipment evaluation/education;Energy conservation;Activityengagement   Goal Expiration Date 12/25/24   OT Treatment Day 0  (Wed 12/18/24)   OT Frequency 3-5x/wk   Discharge Recommendation   Rehab Resource Intensity Level, OT No post-acute rehabilitation needs   AM-PAC Daily Activity Inpatient   Lower Body Dressing 3   Bathing 3   Toileting 3   Upper Body Dressing 4   Grooming 4   Eating 4   Daily Activity Raw Score 21   Daily Activity Standardized Score (Calc for Raw Score >=11) 44.27   AM-PAC Applied Cognition Inpatient   Following a Speech/Presentation 4   Understanding Ordinary Conversation 4   Taking Medications 4   Remembering Where Things Are Placed or Put Away 4   Remembering List of 4-5 Errands 4   Taking Care of Complicated Tasks 4   Applied Cognition Raw Score 24   Applied Cognition Standardized Score 62.21   Barthel Index   Feeding 10   Bathing 0   Grooming Score 5   Dressing Score 5   Bladder Score 10   Bowels Score 10   Toilet Use Score 5   Transfers (Bed/Chair) Score 10   Mobility (Level Surface) Score 10   Stairs Score 5   Barthel Index Score 70   End of Consult   Education Provided Yes   Patient Position at End of Consult Bedside chair;All needs within reach   Nurse Communication Nurse aware of consult   Licensure   [place] License Number  Antonietta Travis OTR/L [id number]         The patient's raw score on the AM-PAC Daily Activity Inpatient Short  Form is 21. A raw score of greater than or equal to 19 suggests the patient may benefit from discharge to home. Please refer to the recommendation of the Occupational Therapist for safe discharge planning.      Pt goals to be met by 12/25/24 to max I w/ ADLs and improve engagement to return home to baseline level of I w/ decreased hip pain includes:    -Pt will complete bed mobility supine <> sit independently in preparation for ADLs to return home    -Pt will complete LBD w/ mod I for + time to improve engagement and minimize burden of care using LHAE as needed    -Pt will complete functional transfers to all surfaces independently using LRAD, DME as needed    -Pt will demonstrate improved functional standing tolerance for at least 15 minutes using LRAD w/ at least fair + balance while engaged in grooming/ bathing w/ mod I to max I w/ light IADLs, stairs    -Pt will demonstrate improved AMPAC score to at least 23 to max I w/ ADLs, assist in DC planning    -Pt will consistently engage in functional mobility using LRAD household distances independently.     ERIC Neville/L  [id number]  [id number]

## 2024-12-18 NOTE — PLAN OF CARE
Problem: PHYSICAL THERAPY ADULT  Goal: Performs mobility at highest level of function for planned discharge setting.  See evaluation for individualized goals.  Description: Treatment/Interventions: Functional transfer training, LE strengthening/ROM, Endurance training, Therapeutic exercise, Bed mobility, Gait training, Spoke to nursing          See flowsheet documentation for full assessment, interventions and recommendations.  Outcome: Progressing  Note: Prognosis: Good  Problem List: Decreased strength, Decreased range of motion, Decreased endurance, Impaired balance, Decreased mobility, Pain, Decreased skin integrity  Assessment: Pt seen for PT session this AM. Pt making good progress towards IP PT goals. Able to progress ambulation x increased distance with decreased level of assist using RW. Able to negotiate x 4 steps with step to pattern with intermittent verbal cues for sequencing with good response. Able to perform exercise with good response ; HEP reviewed + pt verbalized understanding.        Rehab Resource Intensity Level, PT: III (Minimum Resource Intensity)    See flowsheet documentation for full assessment.

## 2024-12-18 NOTE — PROGRESS NOTES
Progress Note - Orthopedics   Name: Pan Gaming 74 y.o. male I MRN: [id number]  Unit/Bed#: 2 [address] Date of Admission: 12/17/2024   Date of Service: 12/18/2024 I Hospital Day: 0    Assessment & Plan  Status post total replacement of left hip  WBAT LLE  PT/OT - anterior precautions  Ancef 2g IV x2 for 24 hours post-op antibiotics  DVT ppx with  mg BID/SCDs/ambulation  Analgesics prn  Follow up AM labs - stable  Dressing- monitor for drainage, Mepilex to remain on anterior aspect of hip in place x7 days  Dispo - okay for discharge from Ortho perspective. The patient has met all discharge criteria with nursing and therapy. He may be discharged home. Begin outpatient PT following his session of PT today. PT states he was cleared for discharge. Discharge instructions and questions were addressed prior to discharge. He will plan to see Dr. Patel in 2 weeks for continued post-operative care.         Subjective   74 y.o.male POD 1 s/p left total hip arthroplasty by Dr. Patel. No acute events, no new complaints. Pain remains well controlled. He denies fevers, chills, CP, SOB, N/V, dizziness, lightheadedness, and numbness/tingling. Patient states PT went well and he is ready to be discharged home. His daughter is coming to pick him up.     Objective :  Temp:  [97.5 °F (36.4 °C)-99.5 °F (37.5 °C)] 98.8 °F (37.1 °C)  HR:  [48-73] 71  BP: ()/(52-79) 132/64  Resp:  [18-20] 18  SpO2:  [97 %-100 %] 100 %  O2 Device: None (Room air)  Nasal Cannula O2 Flow Rate (L/min):  [2 L/min] 2 L/min    Physical Exam  Vitals and nursing note reviewed.   Constitutional:       General: He is not in acute distress.     Appearance: He is well-developed. He is not ill-appearing.   HENT:      Head: Normocephalic and atraumatic.      Mouth/Throat:      Mouth: Mucous membranes are moist.   Eyes:      Extraocular Movements: Extraocular movements intact.      Conjunctiva/sclera: Conjunctivae normal.   Cardiovascular:      Rate and  "Rhythm: Normal rate and regular rhythm.      Pulses: Normal pulses.      Heart sounds: Normal heart sounds.   Pulmonary:      Effort: Pulmonary effort is normal. No respiratory distress.      Breath sounds: Normal breath sounds. No wheezing.   Musculoskeletal:         General: No swelling or tenderness.      Cervical back: Normal range of motion and neck supple.   Skin:     General: Skin is warm and dry.      Capillary Refill: Capillary refill takes less than 2 seconds.      Findings: No bruising or erythema.   Neurological:      General: No focal deficit present.      Mental Status: He is alert and oriented to person, place, and time.   Psychiatric:         Mood and Affect: Mood normal.         Behavior: Behavior normal.       Musculoskeletal: left lower  Skin intact. No erythema or ecchymosis.  Dressing CDI  No significant TTP about left hip  Motor intact to +FHL/EHL, +ankle dorsi/plantar flexion  Sensation intact to saphenous, sural, tibial, superficial peroneal nerve, and deep peroneal  2+ DP pulse  Brisk Cap refill  No calf swelling or tenderness to palpation  Musculature soft and compressible. No pain to passive stretch.       Lab Results: I have reviewed the following results:  Recent Labs     12/18/24  0540   WBC 9.80   HGB 12.7   HCT 37.5      BUN 16   CREATININE 0.75     Blood Culture:  No results found for: \"BLOODCX\"  Wound Culture: No results found for: \"WOUNDCULT\"  "

## 2024-12-18 NOTE — CASE MANAGEMENT
Case Management Discharge Planning Note    Patient name Pan Gaming  Location [address] MRN [id number]  : 1950 Date 2024       Current Admission Date: 2024  Current Admission Diagnosis:Status post total replacement of left hip   Patient Active Problem List    Diagnosis Date Noted Date Diagnosed    Status post total replacement of left hip 2024     Benign prostatic hyperplasia without lower urinary tract symptoms 2024     Prediabetes 2024     Obesity, morbid (HCC) 2024     Vestibular neuronitis of both ears 2024     Chronic pansinusitis 2023     Vertigo 2023     Ankle impingement syndrome, left 05/10/2022     S/P carpal tunnel release 2021     Traumatic tear of right rotator cuff 2021     Class 1 obesity due to excess calories without serious comorbidity with body mass index (BMI) of 33.0 to 33.9 in adult 2020     Hypothyroidism 10/14/2016     Benign essential hypertension 2016     Mixed hyperlipidemia 2016     RYANNE on CPAP 2016       LOS (days): 0  Geometric Mean LOS (GMLOS) (days):   Days to GMLOS:     OBJECTIVE:     Current admission status: Outpatient Surgery   Preferred Pharmacy:   [facility][id number] [address] [address]  Phone: [phone] Fax: [phone]    Primary Care Provider: Frank Lombardi, DO    Primary Insurance: [facility]  Secondary Insurance:     DISCHARGE DETAILS:    Per chart review and physical therapy documentation pt is reported to have no discharge needs at this time.  Pt was admitted following elective orthopedic surgery.  Plan is home with outpatient therapy.  Pt already has DME at home.  SW/CM will be available if needs arise.    Requested Home Health Care         Is the patient interested in HHC at discharge?: No    DME Referral Provided  Referral made for DME?: No    Other Referral/Resources/Interventions  Provided:  Interventions: None Indicated    Treatment Team Recommendation: Home, Outpatient Rehab  Discharge Destination Plan:: Home, Outpatient Rehab  Transport at Discharge : Family

## 2024-12-18 NOTE — ASSESSMENT & PLAN NOTE
WBAT LLE  PT/OT - anterior precautions  Ancef 2g IV x2 for 24 hours post-op antibiotics  DVT ppx with  mg BID/SCDs/ambulation  Analgesics prn  Follow up AM labs - stable  Dressing- monitor for drainage, Mepilex to remain on anterior aspect of hip in place x7 days  Dispo - okay for discharge from Ortho perspective. The patient has met all discharge criteria with nursing and therapy. He may be discharged home. Begin outpatient PT following his session of PT today. PT states he was cleared for discharge. Discharge instructions and questions were addressed prior to discharge. He will plan to see Dr. Patel in 2 weeks for continued post-operative care.

## 2024-12-19 ENCOUNTER — TELEPHONE (OUTPATIENT)
Dept: OBGYN CLINIC | Facility: HOSPITAL | Age: 74
End: 2024-12-19

## 2024-12-19 NOTE — TELEPHONE ENCOUNTER
Patient contacted for a postoperative follow up assessment. Patient states current pain level of a 6/10 when sitting and 3/10 when walking with RW.  Patient reports increase in swelling and dressing is Dressing C/D/I Patient isicing the site regularly. NN educated patient on post-op bruising, swelling, and icing. Cosme Irvin PT 12/20.     We reviewed patients AVS medication list. Patient is taking Tylenol 975mg every 8 hours, Oxycodone 5mg every 4 hours, ASA 325mg BID, Colace 100mg BID, and Meloxicam 15mg daily. Patient has not had a BM but is passing gas.       Patient denies nausea, vomiting, abdominal pain, chest pain, shortness of breath, fever, dizziness, and calf pain. Patient confirmed post-op appointment with surgeon on 1/2 at 8AM .Patient does not have any other questions or concerns at this time. Pt was encouraged to call with any questions, concerns or issues.

## 2024-12-20 ENCOUNTER — TELEPHONE (OUTPATIENT)
Dept: OBGYN CLINIC | Facility: HOSPITAL | Age: 74
End: 2024-12-20

## 2024-12-20 NOTE — TELEPHONE ENCOUNTER
"Spoke to patient.   We discussed \"burning pain\" that worsened at night- he reports its better this AM. We discussed burning pain is \"better\" this AM, and we discussed burning/firey, tingling sensations is often nerve pain, that can radiate down the entire leg.     We discussed postoperative swelling, that too, can radiate down the leg, and should worsen with activity during the day, and improve with rest overnight. We discussed continuing to ICE areas of pain/swelling, especially after increased activity over the next few weeks.      We discussed s/s of concern with swelling, such as any redness, warmth, drainage, fever--- ALL of which he DENIES.     He is going to have his son assist taking a picture and sending via [employer] for review this AM.   "

## 2024-12-20 NOTE — TELEPHONE ENCOUNTER
Caller: Patient    Doctor: Amanda    Reason for call: Patient had left total hip 12/17/24. Starting last night he is experiencing burning in his hip and it is very red and swollen down to his ankle. Pain level is 8. Please call patient. Thank you.    Call back#: [phone]

## 2024-12-20 NOTE — TELEPHONE ENCOUNTER
Spoke to patient, relayed surgeons message and made aware of response in MYCHART.   No further question.  pt encouraged to call me with questions, concerns or issues.

## 2024-12-20 NOTE — TELEPHONE ENCOUNTER
Caller: Adilia Irvin PT    Doctor: Troy    Reason for call: Patient is at PT presently and they need a new PT script placed and faxed to them at [phone].  Thank you.    Call back#: [phone]

## 2025-01-02 ENCOUNTER — APPOINTMENT (OUTPATIENT)
Dept: RADIOLOGY | Facility: CLINIC | Age: 75
End: 2025-01-02
Payer: COMMERCIAL

## 2025-01-02 ENCOUNTER — OFFICE VISIT (OUTPATIENT)
Dept: OBGYN CLINIC | Facility: CLINIC | Age: 75
End: 2025-01-02

## 2025-01-02 VITALS — HEIGHT: 68 IN | WEIGHT: 241.8 LBS | BODY MASS INDEX: 36.65 KG/M2

## 2025-01-02 DIAGNOSIS — Z96.642 AFTERCARE FOLLOWING LEFT HIP JOINT REPLACEMENT SURGERY: ICD-10-CM

## 2025-01-02 DIAGNOSIS — Z47.1 AFTERCARE FOLLOWING LEFT HIP JOINT REPLACEMENT SURGERY: ICD-10-CM

## 2025-01-02 DIAGNOSIS — Z96.642 STATUS POST TOTAL REPLACEMENT OF LEFT HIP: ICD-10-CM

## 2025-01-02 DIAGNOSIS — Z96.642 STATUS POST TOTAL REPLACEMENT OF LEFT HIP: Primary | ICD-10-CM

## 2025-01-02 PROCEDURE — 99024 POSTOP FOLLOW-UP VISIT: CPT | Performed by: ORTHOPAEDIC SURGERY

## 2025-01-02 PROCEDURE — 73502 X-RAY EXAM HIP UNI 2-3 VIEWS: CPT

## 2025-01-02 NOTE — PROGRESS NOTES
Assessment/Plan:  1. Status post total replacement of left hip  XR hip/pelv 2-3 vws left if performed      2. Aftercare following left hip joint replacement surgery          Scribe Attestation      I,:  Beverly Vail am acting as a scribe while in the presence of the attending physician.:       I,:  Aubrey Patel, DO personally performed the services described in this documentation    as scribed in my presence.:           Pan is a pleasant 74 y.o. male who presents 2 weeks status post left total hip arthroplasty. He is doing well postoperatively. I would like him to continue his efforts at physical therapy. He may begin showering at this time, allowing soap and water to run down the area. He should avoid scrubbing the incision and pat the area dry with a clean towel. He should avoid submerging the leg in water at this time. He may apply a warm compress to the surgical site to help resolve the benign hematoma present. He may resume driving at this time, as he is not currently taking narcotic pain medication and the driving leg is unaffected. He should continue taking Tylenol as needed for pain control. He should continue taking Aspirin twice daily for DVT prophylaxis for an additional 4 weeks. He will follow-up in 4 weeks for re-evaluation of the left hip.    Subjective: 2 weeks status post left total hip arthroplasty    Patient ID: Pan Gaming is a 74 y.o. male who presents 2 weeks status post left total hip arthroplasty. He presents using a cane for ambulatory assistance. He denies experiencing significant pain about the left hip. He reports he is experiencing more discomfort from being stiff. He finds he experiences slightly increased pain when going to sleep. He does report the entire left lower extremity is swollen compared to the right leg. He has been attending physical therapy, which he feels is going well. He has been taking Tylenol for pain control as needed. He has been compliant with taking Aspirin for  DVT prophylaxis.    Review of Systems   Constitutional:  Positive for activity change. Negative for chills and fever.   HENT:  Negative for ear pain and sore throat.    Eyes:  Negative for pain and visual disturbance.   Respiratory:  Negative for cough and shortness of breath.    Cardiovascular:  Negative for chest pain and palpitations.   Gastrointestinal:  Negative for abdominal pain and vomiting.   Genitourinary:  Negative for dysuria and hematuria.   Musculoskeletal:  Positive for gait problem. Negative for arthralgias and back pain.   Skin:  Positive for wound (surgical scar anterior left hip). Negative for color change and rash.   Neurological:  Negative for seizures and syncope.   All other systems reviewed and are negative.        Past Medical History:   Diagnosis Date    Arthritis     DJD    CPAP (continuous positive airway pressure) dependence     Disease of thyroid gland     Dizziness     Ear problems     Hypertension     Hypothyroidism     Sleep apnea, obstructive     Sleep difficulties     Wears glasses     for reading       Past Surgical History:   Procedure Laterality Date    APPENDECTOMY      COLONOSCOPY      FOOT SURGERY Left     scar tissue removal from previous injury    INGUINAL HERNIA REPAIR Left     KNEE ARTHROSCOPY Bilateral     [place] ARTHROCENTESIS ASPIR&/INJ MAJOR JT/BURSA W/O US Left 07/19/2024    Procedure: LEFT INTRA-ARTICULAR HIP INJECTION;  Surgeon: Sergey Brewer MD;  Location: [facility] MAIN OR;  Service: Pain Management     [place] ARTHRP ACETBLR/PROX FEM PROSTC AGRFT/ALGRFT Left 12/17/2024    Procedure: ARTHROPLASTY HIP TOTAL ANTERIOR,NAVIGATED - LEFT - SAME DAY;  Surgeon: Aubrey Patel DO;  Location: [facility] MAIN OR;  Service: Orthopedics    ROTATOR CUFF REPAIR Bilateral     TONSILLECTOMY         Family History   Problem Relation Age of Onset    Diabetes Father     Hypertension Father        Social History     Occupational History    Not on file   Tobacco Use    Smoking status: Never     Passive  exposure: Never    Smokeless tobacco: Never   Vaping Use    Vaping status: Never Used   Substance and Sexual Activity    Alcohol use: Yes     Comment: social    Drug use: Never    Sexual activity: Not on file         Current Outpatient Medications:     acetaminophen (TYLENOL) 325 mg tablet, Take 3 tablets (975 mg total) by mouth every 8 (eight) hours, Disp: , Rfl:     aspirin 325 mg tablet, Take 1 tablet (325 mg total) by mouth 2 (two) times a day, Disp: 84 tablet, Rfl: 0    atorvastatin (LIPITOR) 10 mg tablet, Take 1 tablet (10 mg total) by mouth daily, Disp: 90 tablet, Rfl: 1    docusate sodium (COLACE) 100 mg capsule, Take 1 capsule (100 mg total) by mouth 2 (two) times a day, Disp: 60 capsule, Rfl: 0    levothyroxine 75 mcg tablet, Take 75 mcg by mouth every morning, Disp: , Rfl:     losartan-hydrochlorothiazide (HYZAAR) 100-12.5 MG per tablet, Take 1 tablet by mouth daily at bedtime, Disp: , Rfl:     meloxicam (Mobic) 15 mg tablet, Take 1 tablet (15 mg total) by mouth daily, Disp: 14 tablet, Rfl: 0    metFORMIN (GLUCOPHAGE) 500 mg tablet, Take 500 mg by mouth daily with breakfast, Disp: , Rfl:     Multiple Vitamins-Minerals (MACUVITE EYE CARE PO), Take 1 capsule by mouth daily at bedtime, Disp: , Rfl:     oxyCODONE (Roxicodone) 5 immediate release tablet, May take 1 tablet (5 mg total) by mouth every 4 (four) hours as needed for moderate pain or severe pain. May also take 2 tablets (10 mg total) every 6 (six) hours as needed for moderate pain or severe pain. Max Daily Amount: 70 mg., Disp: 40 tablet, Rfl: 0    Allergies   Allergen Reactions    Sulfamethoxazole-Trimethoprim Itching     Bactrim.        Objective:  There were no vitals filed for this visit.    Body mass index is 36.77 kg/m².    Left Hip Exam     Tenderness   The patient is experiencing no tenderness.     Range of Motion   Abduction:  50   Adduction:  20   Extension:  0   Flexion:  90   External rotation:  40   Internal rotation: 10     Other    Erythema: absent  Scars: present (well approximated anterior surgical wound)  Sensation: normal  Pulse: present    Comments:  Minor hematoma at surgical site, no pressure necrosis present  LLE edema  Mild calf tenderness  Patient experiences tightness with Caron's sign            Physical Exam  Vitals and nursing note reviewed.   Constitutional:       Appearance: Normal appearance.   HENT:      Head: Normocephalic and atraumatic.      Right Ear: External ear normal.      Left Ear: External ear normal.      Nose: Nose normal.   Eyes:      General: No scleral icterus.     Extraocular Movements: Extraocular movements intact.      Conjunctiva/sclera: Conjunctivae normal.   Cardiovascular:      Rate and Rhythm: Normal rate.   Pulmonary:      Effort: Pulmonary effort is normal. No respiratory distress.   Musculoskeletal:         General: Tenderness present.      Cervical back: Normal range of motion and neck supple.      Left lower leg: Edema present.      Comments: See ortho exam   Skin:     General: Skin is warm and dry.   Neurological:      Mental Status: He is alert and oriented to person, place, and time.   Psychiatric:         Mood and Affect: Mood normal.         Behavior: Behavior normal.         I have personally reviewed pertinent films in PACS.  X-rays of the left hip obtained in the office today demonstrate a well-positioned, well-aligned total hip prosthesis. No evidence of fracture or failure.    This document was created using speech voice recognition software.   Grammatical errors, random word insertions, pronoun errors, and incomplete sentences are an occasional consequence of this system due to software limitations, ambient noise, and hardware issues.   Any formal questions or concerns about content, text, or information contained within the body of this dictation should be directly addressed to the provider for clarification.

## 2025-01-17 DIAGNOSIS — E78.2 MIXED HYPERLIPIDEMIA: ICD-10-CM

## 2025-01-17 RX ORDER — ATORVASTATIN CALCIUM 10 MG/1
10 TABLET, FILM COATED ORAL DAILY
Qty: 90 TABLET | Refills: 1 | Status: SHIPPED | OUTPATIENT
Start: 2025-01-17 | End: 2025-07-16

## 2025-01-17 NOTE — TELEPHONE ENCOUNTER
- This is NOT a duplicate. Patient needs this to be sent to the mail order.     Reason for call:   [x] Refill   [] Prior Auth  [] Other:     Office:   [x] PCP/Provider -   [] Specialty/Provider -     Medication:   - Atorvastatin 10mg- take 1 tablet by mouth daily      Pharmacy: [facility]    Does the patient have enough for 3 days?   [] Yes   [x] No - Send as HP to POD

## 2025-01-30 ENCOUNTER — OFFICE VISIT (OUTPATIENT)
Dept: OBGYN CLINIC | Facility: CLINIC | Age: 75
End: 2025-01-30

## 2025-01-30 VITALS — WEIGHT: 236.2 LBS | HEIGHT: 68 IN | BODY MASS INDEX: 35.8 KG/M2

## 2025-01-30 DIAGNOSIS — Z96.642 AFTERCARE FOLLOWING LEFT HIP JOINT REPLACEMENT SURGERY: ICD-10-CM

## 2025-01-30 DIAGNOSIS — Z96.642 STATUS POST TOTAL REPLACEMENT OF LEFT HIP: Primary | ICD-10-CM

## 2025-01-30 DIAGNOSIS — Z47.1 AFTERCARE FOLLOWING LEFT HIP JOINT REPLACEMENT SURGERY: ICD-10-CM

## 2025-01-30 PROCEDURE — 99024 POSTOP FOLLOW-UP VISIT: CPT | Performed by: ORTHOPAEDIC SURGERY

## 2025-01-30 NOTE — PROGRESS NOTES
Assessment/Plan:  1. Status post total replacement of left hip        2. Aftercare following left hip joint replacement surgery          Scribe Attestation      I,:  Chris Canales PA-C am acting as a scribe while in the presence of the attending physician.:       I,:  Aubrey Patel, DO personally performed the services described in this documentation    as scribed in my presence.:           Pan is a very pleasant 75-year-old gentleman presenting today for follow-up 6 weeks after a direct anterior left total hip arthroplasty.  He is doing exceptionally well at this point.  His incision is healing very nicely.  There is 1 small area in the inguinal crease of early distance.  We encouraged him to utilize a Band-Aid and Neosporin at his discretion.  We anticipate that this will go on to heal without further incident.  He is done very well to regain strength and range of motion.  He may transition from physical therapy to his home exercise program when he and his therapist see fit.  He is completed his DVT prophylaxis and now may resume any medications he was taking prior to surgery.  We will plan to see him back in 6 weeks with an x-ray on arrival of his left hip.  He expressed understanding all of his questions were addressed    Subjective: 6 weeks status post direct anterior left total hip arthroplasty    Patient ID: Pan Gaming is a 75 y.o. male presenting today for follow-up of surgery.  He reports that he is doing very well.  He does not have any significant activity related discomfort in the left groin or left hip.  He has been working with physical therapy and is pleased with his progress overall.  He has been gradually returning to activities of daily living and enjoyment.  He is completed his aspirin for DVT prophylaxis.  He denies any new injuries or falls    Review of Systems   Constitutional: Negative.    HENT: Negative.     Eyes: Negative.    Respiratory: Negative.     Cardiovascular:  Negative.    Gastrointestinal: Negative.    Endocrine: Negative.    Genitourinary: Negative.    Musculoskeletal:  Positive for myalgias.   Skin: Negative.    Allergic/Immunologic: Negative.    Neurological: Negative.    Hematological: Negative.    Psychiatric/Behavioral: Negative.       Past Medical History:   Diagnosis Date    Arthritis     DJD    CPAP (continuous positive airway pressure) dependence     Disease of thyroid gland     Dizziness     Ear problems     Hypertension     Hypothyroidism     Sleep apnea, obstructive     Sleep difficulties     Wears glasses     for reading       Past Surgical History:   Procedure Laterality Date    APPENDECTOMY      COLONOSCOPY      FOOT SURGERY Left     scar tissue removal from previous injury    INGUINAL HERNIA REPAIR Left     KNEE ARTHROSCOPY Bilateral     [place] ARTHROCENTESIS ASPIR&/INJ MAJOR JT/BURSA W/O US Left 07/19/2024    Procedure: LEFT INTRA-ARTICULAR HIP INJECTION;  Surgeon: Sergey Brewer MD;  Location: [facility] MAIN OR;  Service: Pain Management     [place] ARTHRP ACETBLR/PROX FEM PROSTC AGRFT/ALGRFT Left 12/17/2024    Procedure: ARTHROPLASTY HIP TOTAL ANTERIOR,NAVIGATED - LEFT - SAME DAY;  Surgeon: Aubrey Patel DO;  Location: [facility] MAIN OR;  Service: Orthopedics    ROTATOR CUFF REPAIR Bilateral     TONSILLECTOMY         Family History   Problem Relation Age of Onset    Diabetes Father     Hypertension Father        Social History     Occupational History    Not on file   Tobacco Use    Smoking status: Never     Passive exposure: Never    Smokeless tobacco: Never   Vaping Use    Vaping status: Never Used   Substance and Sexual Activity    Alcohol use: Yes     Comment: social    Drug use: Never    Sexual activity: Not on file         Current Outpatient Medications:     acetaminophen (TYLENOL) 325 mg tablet, Take 3 tablets (975 mg total) by mouth every 8 (eight) hours, Disp: , Rfl:     atorvastatin (LIPITOR) 10 mg tablet, Take 1 tablet (10 mg total) by mouth daily, Disp: 90  tablet, Rfl: 1    levothyroxine 75 mcg tablet, Take 75 mcg by mouth every morning, Disp: , Rfl:     losartan-hydrochlorothiazide (HYZAAR) 100-12.5 MG per tablet, Take 1 tablet by mouth daily at bedtime, Disp: , Rfl:     metFORMIN (GLUCOPHAGE) 500 mg tablet, Take 500 mg by mouth daily with breakfast, Disp: , Rfl:     Multiple Vitamins-Minerals (MACUVITE EYE CARE PO), Take 1 capsule by mouth daily at bedtime, Disp: , Rfl:     Allergies   Allergen Reactions    Sulfamethoxazole-Trimethoprim Itching     Bactrim.        Objective:  There were no vitals filed for this visit.    Body mass index is 35.91 kg/m².    Left Hip Exam     Tenderness   The patient is experiencing no tenderness.     Range of Motion   Abduction:  50 normal   Adduction:  20 normal   Extension:  0 normal   Flexion:  110 normal   External rotation:  50 normal   Internal rotation: 20 normal     Muscle Strength   Abduction: 5/5   Adduction: 5/5   Flexion: 5/5     Tests   ALEXANDRA: negative  Miguel A: negative    Other   Erythema: absent  Scars: present  Sensation: normal  Pulse: present    Comments:  Interstitial well-approximated without signs of erythema, warmth, drainage.  There is a small area of early distance at the inguinal crease that was covered with a Band-Aid.  No drainage, fluctuance, or induration  Thigh and calf soft nontender  Ambulates with a minimally antalgic gait on the left with a cane  Negative ALEXANDRA Araujo, logroll            Physical Exam  Vitals and nursing note reviewed.   Constitutional:       Appearance: Normal appearance. He is well-developed.      Comments: Body mass index is 35.91 kg/m².   HENT:      Head: Normocephalic and atraumatic.      Right Ear: External ear normal.      Left Ear: External ear normal.   Eyes:      Extraocular Movements: Extraocular movements intact.      Conjunctiva/sclera: Conjunctivae normal.   Cardiovascular:      Rate and Rhythm: Normal rate.      Pulses: Normal pulses.   Pulmonary:      Effort:  Pulmonary effort is normal.   Musculoskeletal:      Cervical back: Normal range of motion.      Comments: See ortho exam   Skin:     General: Skin is warm and dry.   Neurological:      General: No focal deficit present.      Mental Status: He is alert and oriented to person, place, and time. Mental status is at baseline.   Psychiatric:         Mood and Affect: Mood normal.         Behavior: Behavior normal.         Thought Content: Thought content normal.         Judgment: Judgment normal.       This document was created using speech voice recognition software.   Grammatical errors, random word insertions, pronoun errors, and incomplete sentences are an occasional consequence of this system due to software limitations, ambient noise, and hardware issues.   Any formal questions or concerns about content, text, or information contained within the body of this dictation should be directly addressed to the provider for clarification.

## 2025-02-20 ENCOUNTER — OFFICE VISIT (OUTPATIENT)
Dept: OBGYN CLINIC | Facility: CLINIC | Age: 75
End: 2025-02-20

## 2025-02-20 ENCOUNTER — APPOINTMENT (OUTPATIENT)
Dept: RADIOLOGY | Facility: CLINIC | Age: 75
End: 2025-02-20
Payer: COMMERCIAL

## 2025-02-20 VITALS — BODY MASS INDEX: 35.68 KG/M2 | HEIGHT: 68 IN | WEIGHT: 235.4 LBS

## 2025-02-20 DIAGNOSIS — Z96.642 AFTERCARE FOLLOWING LEFT HIP JOINT REPLACEMENT SURGERY: ICD-10-CM

## 2025-02-20 DIAGNOSIS — Z47.1 AFTERCARE FOLLOWING LEFT HIP JOINT REPLACEMENT SURGERY: ICD-10-CM

## 2025-02-20 DIAGNOSIS — Z96.642 STATUS POST TOTAL REPLACEMENT OF LEFT HIP: ICD-10-CM

## 2025-02-20 DIAGNOSIS — Z96.642 STATUS POST TOTAL REPLACEMENT OF LEFT HIP: Primary | ICD-10-CM

## 2025-02-20 DIAGNOSIS — M54.16 LUMBAR RADICULOPATHY: ICD-10-CM

## 2025-02-20 PROCEDURE — 99024 POSTOP FOLLOW-UP VISIT: CPT | Performed by: ORTHOPAEDIC SURGERY

## 2025-02-20 PROCEDURE — 73502 X-RAY EXAM HIP UNI 2-3 VIEWS: CPT

## 2025-02-20 NOTE — PROGRESS NOTES
"Assessment/Plan:  1. Status post total replacement of left hip  XR hip/pelv 2-3 vws left if performed      2. Aftercare following left hip joint replacement surgery  XR hip/pelv 2-3 vws left if performed      3. Lumbar radiculopathy  Ambulatory Referral to Physical Therapy        Scribe Attestation      I,:  Wilian Blair am acting as a scribe while in the presence of the attending physician.:       I,:  Aubrey Patel, DO personally performed the services described in this documentation    as scribed in my presence.:           Pan is a very pleasant 75-year-old gentleman who presents today for follow-up evaluation 9-week status post left total hip arthroplasty.  He is recovering very well from his hip surgery and I am pleased with his imaging and his clinical presentation with respect to his hip today.  I explained that the symptoms he is experiencing are radicular in nature.  I recommended shifting the focus of his physical therapy from his hip to lumbar radiculopathy.  I provided him with a new referral today.  He will call if he experiences persistent symptoms and I will place referral to our spine and pain team.  Otherwise, we will plan to see him back in 9 months for his anniversary visit with new x-ray on arrival.  All of his questions and concerns were addressed today.    Subjective: Follow up evaluation 9 weeks status post left total hip arthroplasty    Patient ID: Pan Gaming is a 75 y.o. male who presents today for follow-up evaluation 9 weeks status post left total hip arthroplasty.  At today's visit, he complains of pain about the posterolateral aspect of his hip that can radiate to his lower leg.  This is accompanied by tingling.  He experiences this a few times per day and also feels as though his leg will \"give out\".  He denies any pain in his groin or recent injury or trauma.  He is still participating in physical therapy.  He denies any recent injury or trauma.    Review of Systems "   Constitutional:  Positive for activity change. Negative for chills, fever and unexpected weight change.   HENT:  Negative for hearing loss, nosebleeds and sore throat.    Eyes:  Negative for pain, redness and visual disturbance.   Respiratory:  Negative for cough, shortness of breath and wheezing.    Cardiovascular:  Negative for chest pain, palpitations and leg swelling.   Gastrointestinal:  Negative for abdominal pain, nausea and vomiting.   Endocrine: Negative for polyphagia and polyuria.   Genitourinary:  Negative for dysuria and hematuria.   Musculoskeletal:  Negative for arthralgias, joint swelling and myalgias.        See HPI   Skin:  Negative for rash and wound.   Neurological:  Negative for dizziness, numbness and headaches.   Psychiatric/Behavioral:  Negative for decreased concentration and suicidal ideas. The patient is not nervous/anxious.          Past Medical History:   Diagnosis Date    Arthritis     DJD    CPAP (continuous positive airway pressure) dependence     Disease of thyroid gland     Dizziness     Ear problems     Hypertension     Hypothyroidism     Sleep apnea, obstructive     Sleep difficulties     Wears glasses     for reading       Past Surgical History:   Procedure Laterality Date    APPENDECTOMY      COLONOSCOPY      FOOT SURGERY Left     scar tissue removal from previous injury    INGUINAL HERNIA REPAIR Left     KNEE ARTHROSCOPY Bilateral     [place] ARTHROCENTESIS ASPIR&/INJ MAJOR JT/BURSA W/O US Left 07/19/2024    Procedure: LEFT INTRA-ARTICULAR HIP INJECTION;  Surgeon: Sergey Brewer MD;  Location: [facility] MAIN OR;  Service: Pain Management     [place] ARTHRP ACETBLR/PROX FEM PROSTC AGRFT/ALGRFT Left 12/17/2024    Procedure: ARTHROPLASTY HIP TOTAL ANTERIOR,NAVIGATED - LEFT - SAME DAY;  Surgeon: Aubrey Patel DO;  Location: [facility] MAIN OR;  Service: Orthopedics    ROTATOR CUFF REPAIR Bilateral     TONSILLECTOMY         Family History   Problem Relation Age of Onset    Diabetes Father      Hypertension Father        Social History     Occupational History    Not on file   Tobacco Use    Smoking status: Never     Passive exposure: Never    Smokeless tobacco: Never   Vaping Use    Vaping status: Never Used   Substance and Sexual Activity    Alcohol use: Yes     Comment: social    Drug use: Never    Sexual activity: Not on file         Current Outpatient Medications:     acetaminophen (TYLENOL) 325 mg tablet, Take 3 tablets (975 mg total) by mouth every 8 (eight) hours, Disp: , Rfl:     atorvastatin (LIPITOR) 10 mg tablet, Take 1 tablet (10 mg total) by mouth daily, Disp: 90 tablet, Rfl: 1    levothyroxine 75 mcg tablet, Take 75 mcg by mouth every morning, Disp: , Rfl:     losartan-hydrochlorothiazide (HYZAAR) 100-12.5 MG per tablet, Take 1 tablet by mouth daily at bedtime, Disp: , Rfl:     metFORMIN (GLUCOPHAGE) 500 mg tablet, Take 500 mg by mouth daily with breakfast, Disp: , Rfl:     Multiple Vitamins-Minerals (MACUVITE EYE CARE PO), Take 1 capsule by mouth daily at bedtime, Disp: , Rfl:     Allergies   Allergen Reactions    Sulfamethoxazole-Trimethoprim Itching     Bactrim.        Objective:  There were no vitals filed for this visit.    Body mass index is 35.79 kg/m².    Left Hip Exam     Tenderness   The patient is experiencing no tenderness.     Range of Motion   Abduction:  50 normal   Adduction:  20 normal   Extension:  0 normal   Flexion:  130 normal   External rotation:  50 normal   Internal rotation: 20 normal     Muscle Strength   Abduction: 5/5   Adduction: 5/5   Flexion: 5/5     Tests   ALEXANDRA: negative    Other   Erythema: absent  Scars: present  Sensation: normal  Pulse: present    Comments:  Well healed anterior surgical scar              Physical Exam  Vitals and nursing note reviewed.   Constitutional:       Appearance: Normal appearance. He is well-developed.   HENT:      Head: Normocephalic and atraumatic.      Right Ear: External ear normal.      Left Ear: External ear normal.       Nose: Nose normal.   Eyes:      General: No scleral icterus.     Extraocular Movements: Extraocular movements intact.      Conjunctiva/sclera: Conjunctivae normal.   Cardiovascular:      Rate and Rhythm: Normal rate.   Pulmonary:      Effort: Pulmonary effort is normal. No respiratory distress.   Musculoskeletal:      Cervical back: Normal range of motion and neck supple.      Comments: See Ortho exam   Skin:     General: Skin is warm and dry.   Neurological:      General: No focal deficit present.      Mental Status: He is alert and oriented to person, place, and time.   Psychiatric:         Behavior: Behavior normal.         I have personally reviewed pertinent films in PACS.    X-ray of the left hip obtained on 2/20/2025 reviewed demonstrating a well-positioned and aligned total hip arthroplasty with evidence of failure.  There is no fracture, dislocation, lytic blastic lesion.    This document was created using speech voice recognition software.   Grammatical errors, random word insertions, pronoun errors, and incomplete sentences are an occasional consequence of this system due to software limitations, ambient noise, and hardware issues.   Any formal questions or concerns about content, text, or information contained within the body of this dictation should be directly addressed to the provider for clarification.

## 2025-02-24 NOTE — TELEPHONE ENCOUNTER
Pt called requesting PT RX for low back pain be faxed to [facility], [phone].    Faxed RX as requested.    Pt also stating that he need Antibiotics for an upcoming dental appt next month.  Please send RX to [facility]

## 2025-02-25 DIAGNOSIS — Z96.642 STATUS POST TOTAL REPLACEMENT OF LEFT HIP: Primary | ICD-10-CM

## 2025-02-25 RX ORDER — AMOXICILLIN 500 MG/1
2000 TABLET, FILM COATED ORAL
Qty: 4 TABLET | Refills: 2 | Status: SHIPPED | OUTPATIENT
Start: 2025-02-25 | End: 2025-05-26

## 2025-05-15 ENCOUNTER — APPOINTMENT (OUTPATIENT)
Dept: RADIOLOGY | Facility: CLINIC | Age: 75
End: 2025-05-15
Attending: ORTHOPAEDIC SURGERY
Payer: COMMERCIAL

## 2025-05-15 ENCOUNTER — OFFICE VISIT (OUTPATIENT)
Dept: OBGYN CLINIC | Facility: CLINIC | Age: 75
End: 2025-05-15
Payer: COMMERCIAL

## 2025-05-15 VITALS — BODY MASS INDEX: 36.59 KG/M2 | HEIGHT: 68 IN | WEIGHT: 241.4 LBS

## 2025-05-15 DIAGNOSIS — Z96.642 STATUS POST TOTAL REPLACEMENT OF LEFT HIP: Primary | ICD-10-CM

## 2025-05-15 DIAGNOSIS — Z96.642 STATUS POST TOTAL REPLACEMENT OF LEFT HIP: ICD-10-CM

## 2025-05-15 DIAGNOSIS — R60.0 LEG EDEMA, LEFT: ICD-10-CM

## 2025-05-15 DIAGNOSIS — Z96.642 AFTERCARE FOLLOWING LEFT HIP JOINT REPLACEMENT SURGERY: ICD-10-CM

## 2025-05-15 DIAGNOSIS — Z47.1 AFTERCARE FOLLOWING LEFT HIP JOINT REPLACEMENT SURGERY: ICD-10-CM

## 2025-05-15 PROCEDURE — 73502 X-RAY EXAM HIP UNI 2-3 VIEWS: CPT

## 2025-05-15 PROCEDURE — 99214 OFFICE O/P EST MOD 30 MIN: CPT | Performed by: ORTHOPAEDIC SURGERY

## 2025-06-27 ENCOUNTER — OFFICE VISIT (OUTPATIENT)
Dept: OBGYN CLINIC | Facility: CLINIC | Age: 75
End: 2025-06-27
Payer: COMMERCIAL

## 2025-06-27 VITALS — WEIGHT: 239 LBS | HEIGHT: 68 IN | BODY MASS INDEX: 36.22 KG/M2

## 2025-06-27 DIAGNOSIS — Z96.642 STATUS POST TOTAL REPLACEMENT OF LEFT HIP: Primary | ICD-10-CM

## 2025-06-27 PROCEDURE — 99214 OFFICE O/P EST MOD 30 MIN: CPT | Performed by: ORTHOPAEDIC SURGERY

## 2025-06-27 NOTE — PROGRESS NOTES
"Name: Pan Gaming      : 1950      MRN: [id number]  Encounter Provider: Aubrey Patel DO  Encounter Date: 2025   Encounter department: [facility]  :  Assessment & Plan  Status post total replacement of left hip              Pan Gaming is a pleasant 75 y.o. male presenting today for follow-up 6-1/2 months after a direct anterior left total hip arthroplasty.  We again do not appreciate any issues with the prosthesis.  The tightness sensation could be due to some transient swelling or some continued muscle soreness as his hip matures.  There is no concern for any significant abnormality or infection.  We encouraged him to resume his home exercise program and to continue modifying his activity based on the tolerance of his hip.  Will plan to see him back in 6 months for the anniversary of his surgery with an x-ray on arrival of his left hip.  He is aware that he needs antibiotics before a dental appointment.  All questions addressed        Subjective: Left hip follow-up    History: Pan Gaming is a 75 y.o. male presenting today for follow-up 6 and half months after direct anterior left total of arthroplasty and 6 weeks from his last visit.  He states that he has modified his activities, which did initially help with some of his discomfort and swelling in the left leg.  However, he has noted a consistent tightness in the upper left thigh.  He states he has not been doing his home exercises from physical therapy.  He denies any recent injuries or falls.  He denies any recent illnesses and has not noted any drainage from the incision    Estimated body mass index is 36.34 kg/m² as calculated from the following:    Height as of this encounter: 5' 8\" (1.727 m).    Weight as of this encounter: 108 kg (239 lb).    Lab Results   Component Value Date    HGBA1C 5.7 (H) 2024       Social History     Occupational History    Not on file   Tobacco Use    Smoking status: " Never     Passive exposure: Never    Smokeless tobacco: Never   Vaping Use    Vaping status: Never Used   Substance and Sexual Activity    Alcohol use: Yes     Comment: social    Drug use: Never    Sexual activity: Not on file       Objective:  Left Hip Exam     Tenderness   The patient is experiencing tenderness in the anterior.    Range of Motion   Abduction:  50 normal   Adduction:  20 normal   Extension:  0 normal   Flexion:  130 normal   External rotation:  50 normal   Internal rotation: 20 normal     Muscle Strength   Abduction: 5/5   Adduction: 5/5   Flexion: 5/5     Tests   ALEXANDRA: negative  Miguel A: negative    Other   Erythema: absent  Scars: present  Sensation: normal  Pulse: present    Comments:  Well healed anterior surgical scar  Excellent range of motion without pain  Negative logroll, Gayle, ALEXANDRA, FADIR  Thigh and calf nontender  Minimal left hip edema compared to contralateral side  Grossly distally neurovascular intact  Minimal tenderness proximal hip flexors            There were no vitals filed for this visit.    Past Medical History[1]    Past Surgical History[2]    Family History[3]    Current Medications[4]    Allergies[5]      Review of Systems   Constitutional: Negative.    HENT: Negative.     Eyes: Negative.    Respiratory: Negative.     Cardiovascular: Negative.    Gastrointestinal: Negative.    Endocrine: Negative.    Genitourinary: Negative.    Musculoskeletal:  Positive for arthralgias, joint swelling and myalgias.   Skin: Negative.    Allergic/Immunologic: Negative.    Neurological: Negative.    Hematological: Negative.    Psychiatric/Behavioral: Negative.           Physical Exam  Vitals and nursing note reviewed.   Constitutional:       Appearance: Normal appearance. He is well-developed.      Comments: Body mass index is 36.34 kg/m².   HENT:      Head: Normocephalic and atraumatic.      Right Ear: External ear normal.      Left Ear: External ear normal.     Eyes:      Extraocular  Movements: Extraocular movements intact.      Conjunctiva/sclera: Conjunctivae normal.       Cardiovascular:      Rate and Rhythm: Normal rate.      Pulses: Normal pulses.   Pulmonary:      Effort: Pulmonary effort is normal.   Abdominal:      Palpations: Abdomen is soft.     Musculoskeletal:      Cervical back: Normal range of motion.      Comments: See ortho exam     Skin:     General: Skin is warm and dry.     Neurological:      General: No focal deficit present.      Mental Status: He is alert and oriented to person, place, and time. Mental status is at baseline.     Psychiatric:         Mood and Affect: Mood normal.         Behavior: Behavior normal.         Thought Content: Thought content normal.         Judgment: Judgment normal.         Scribe Attestation      I,:  Chris Canales PA-C am acting as a scribe while in the presence of the attending physician.:       I,:  Aubrey Patel, DO personally performed the services described in this documentation    as scribed in my presence.:             This document was created using speech voice recognition software.   Grammatical errors, random word insertions, pronoun errors, and incomplete sentences are an occasional consequence of this system due to software limitations, ambient noise, and hardware issues.   Any formal questions or concerns about content, text, or information contained within the body of this dictation should be directly addressed to the provider for clarification.         [1]   Past Medical History:  Diagnosis Date    Arthritis     DJD    CPAP (continuous positive airway pressure) dependence     Disease of thyroid gland     Dizziness     Ear problems     Hypertension     Hypothyroidism     Sleep apnea, obstructive     Sleep difficulties     Wears glasses     for reading   [2]   Past Surgical History:  Procedure Laterality Date    APPENDECTOMY      COLONOSCOPY      FOOT SURGERY Left     scar tissue removal from previous injury     INGUINAL HERNIA REPAIR Left     KNEE ARTHROSCOPY Bilateral     OH ARTHROCENTESIS ASPIR&/INJ MAJOR JT/BURSA W/O US Left 07/19/2024    Procedure: LEFT INTRA-ARTICULAR HIP INJECTION;  Surgeon: Sergey Brewer MD;  Location: [facility] MAIN OR;  Service: Pain Management     OH ARTHRP ACETBLR/PROX FEM PROSTC AGRFT/ALGRFT Left 12/17/2024    Procedure: ARTHROPLASTY HIP TOTAL ANTERIOR,NAVIGATED - LEFT - SAME DAY;  Surgeon: Aubrey Patel DO;  Location: [facility] MAIN OR;  Service: Orthopedics    ROTATOR CUFF REPAIR Bilateral     TONSILLECTOMY     [3]   Family History  Problem Relation Name Age of Onset    Diabetes Father      Hypertension Father     [4]   Current Outpatient Medications:     acetaminophen (TYLENOL) 325 mg tablet, Take 3 tablets (975 mg total) by mouth every 8 (eight) hours, Disp: , Rfl:     atorvastatin (LIPITOR) 10 mg tablet, Take 1 tablet (10 mg total) by mouth daily, Disp: 90 tablet, Rfl: 1    levothyroxine 75 mcg tablet, Take 75 mcg by mouth every morning, Disp: , Rfl:     losartan-hydrochlorothiazide (HYZAAR) 100-12.5 MG per tablet, Take 1 tablet by mouth daily at bedtime, Disp: , Rfl:     metFORMIN (GLUCOPHAGE) 500 mg tablet, Take 500 mg by mouth daily with breakfast, Disp: , Rfl:     Multiple Vitamins-Minerals (MACUV[facility] EYE CARE PO), Take 1 capsule by mouth daily at bedtime, Disp: , Rfl:   [5]   Allergies  Allergen Reactions    Sulfamethoxazole-Trimethoprim Itching     Bactrim.

## 2025-07-10 ENCOUNTER — RA CDI HCC (OUTPATIENT)
Dept: OTHER | Facility: HOSPITAL | Age: 75
End: 2025-07-10

## 2025-07-24 ENCOUNTER — APPOINTMENT (OUTPATIENT)
Dept: LAB | Facility: HOSPITAL | Age: 75
End: 2025-07-24
Attending: FAMILY MEDICINE
Payer: COMMERCIAL

## 2025-07-24 DIAGNOSIS — E03.9 HYPOTHYROIDISM, UNSPECIFIED TYPE: ICD-10-CM

## 2025-07-24 DIAGNOSIS — E78.2 MIXED HYPERLIPIDEMIA: ICD-10-CM

## 2025-07-24 DIAGNOSIS — R73.03 PREDIABETES: ICD-10-CM

## 2025-07-24 DIAGNOSIS — I10 BENIGN ESSENTIAL HYPERTENSION: ICD-10-CM

## 2025-07-24 LAB
ALBUMIN SERPL BCG-MCNC: 4.3 G/DL (ref 3.5–5)
ALP SERPL-CCNC: 40 U/L (ref 34–104)
ALT SERPL W P-5'-P-CCNC: 16 U/L (ref 7–52)
ANION GAP SERPL CALCULATED.3IONS-SCNC: 5 MMOL/L (ref 4–13)
AST SERPL W P-5'-P-CCNC: 13 U/L (ref 13–39)
BILIRUB SERPL-MCNC: 0.79 MG/DL (ref 0.2–1)
BUN SERPL-MCNC: 21 MG/DL (ref 5–25)
CALCIUM SERPL-MCNC: 9.4 MG/DL (ref 8.4–10.2)
CHLORIDE SERPL-SCNC: 103 MMOL/L (ref 96–108)
CHOLEST SERPL-MCNC: 151 MG/DL (ref ?–200)
CO2 SERPL-SCNC: 30 MMOL/L (ref 21–32)
CREAT SERPL-MCNC: 0.83 MG/DL (ref 0.6–1.3)
ERYTHROCYTE [DISTWIDTH] IN BLOOD BY AUTOMATED COUNT: 14.3 % (ref 11.6–15.1)
EST. AVERAGE GLUCOSE BLD GHB EST-MCNC: 126 MG/DL
GFR SERPL CREATININE-BSD FRML MDRD: 87 ML/MIN/1.73SQ M
GLUCOSE P FAST SERPL-MCNC: 118 MG/DL (ref 65–99)
HBA1C MFR BLD: 6 %
HCT VFR BLD AUTO: 42.4 % (ref 36.5–49.3)
HDLC SERPL-MCNC: 60 MG/DL
HGB BLD-MCNC: 13.7 G/DL (ref 12–17)
LDLC SERPL CALC-MCNC: 65 MG/DL (ref 0–100)
MCH RBC QN AUTO: 31.4 PG (ref 26.8–34.3)
MCHC RBC AUTO-ENTMCNC: 32.3 G/DL (ref 31.4–37.4)
MCV RBC AUTO: 97 FL (ref 82–98)
PLATELET # BLD AUTO: 195 THOUSANDS/UL (ref 149–390)
PMV BLD AUTO: 9.5 FL (ref 8.9–12.7)
POTASSIUM SERPL-SCNC: 4.9 MMOL/L (ref 3.5–5.3)
PROT SERPL-MCNC: 6.9 G/DL (ref 6.4–8.4)
RBC # BLD AUTO: 4.36 MILLION/UL (ref 3.88–5.62)
SODIUM SERPL-SCNC: 138 MMOL/L (ref 135–147)
TRIGL SERPL-MCNC: 132 MG/DL (ref ?–150)
TSH SERPL DL<=0.05 MIU/L-ACNC: 3.69 UIU/ML (ref 0.45–4.5)
WBC # BLD AUTO: 5.46 THOUSAND/UL (ref 4.31–10.16)

## 2025-07-24 PROCEDURE — 84443 ASSAY THYROID STIM HORMONE: CPT

## 2025-07-24 PROCEDURE — 85027 COMPLETE CBC AUTOMATED: CPT

## 2025-07-24 PROCEDURE — 36415 COLL VENOUS BLD VENIPUNCTURE: CPT

## 2025-07-24 PROCEDURE — 80061 LIPID PANEL: CPT

## 2025-07-24 PROCEDURE — 83036 HEMOGLOBIN GLYCOSYLATED A1C: CPT

## 2025-07-24 PROCEDURE — 80053 COMPREHEN METABOLIC PANEL: CPT

## 2025-08-11 ENCOUNTER — PREP FOR PROCEDURE (OUTPATIENT)
Age: 75
End: 2025-08-11

## 2025-08-11 ENCOUNTER — OFFICE VISIT (OUTPATIENT)
Dept: FAMILY MEDICINE CLINIC | Facility: CLINIC | Age: 75
End: 2025-08-11
Payer: COMMERCIAL

## 2025-08-11 ENCOUNTER — TELEPHONE (OUTPATIENT)
Age: 75
End: 2025-08-11

## 2025-08-11 PROBLEM — E66.01 OBESITY, MORBID (HCC): Status: RESOLVED | Noted: 2024-04-29 | Resolved: 2025-08-11

## 2025-08-14 ENCOUNTER — ANESTHESIA (OUTPATIENT)
Dept: ANESTHESIOLOGY | Facility: HOSPITAL | Age: 75
End: 2025-08-14

## 2025-08-14 ENCOUNTER — ANESTHESIA EVENT (OUTPATIENT)
Dept: ANESTHESIOLOGY | Facility: HOSPITAL | Age: 75
End: 2025-08-14

## (undated) DEVICE — PAD CAST 4 IN COTTON NON STERILE

## (undated) DEVICE — BIPOLAR SEALER 23-113-1 AQM 2.3: Brand: AQUAMANTYS™

## (undated) DEVICE — VEST SURGEON DISPOSABLE

## (undated) DEVICE — ANTIBACTERIAL UNDYED BRAIDED (POLYGLACTIN 910), SYNTHETIC ABSORBABLE SUTURE: Brand: COATED VICRYL

## (undated) DEVICE — PACK MAJOR ORTHO W/SPLITS PBDS

## (undated) DEVICE — NEPTUNE E-SEP SMOKE EVACUATION PENCIL, COATED, 70MM BLADE, PUSH BUTTON SWITCH: Brand: NEPTUNE E-SEP

## (undated) DEVICE — TRAY PAIN SUPPORT

## (undated) DEVICE — SUT ETHIBOND 2 V-37 30 IN MX69G

## (undated) DEVICE — TUBE MINI KAMVAC SUCTION 7310 7 LATEX FREE

## (undated) DEVICE — TIBURON SPLIT SHEET: Brand: CONVERTORS

## (undated) DEVICE — ELECTRODE BLADE E-Z CLEAN 6.5IN -0014

## (undated) DEVICE — 3M™ STERI-DRAPE™ U-DRAPE 1015: Brand: STERI-DRAPE™

## (undated) DEVICE — FLEXIBLE ADHESIVE BANDAGE,X-LARGE: Brand: CURITY

## (undated) DEVICE — SUT SILK 3-0 30 IN SA84H

## (undated) DEVICE — NEEDLE SPINAL 22G X 3.5 IN PLST HUB

## (undated) DEVICE — CHLORAPREP HI-LITE 26ML ORANGE

## (undated) DEVICE — 3M™ IOBAN™ 2 ANTIMICROBIAL INCISE DRAPE 6650EZ: Brand: IOBAN™ 2

## (undated) DEVICE — HOOD: Brand: FLYTE, SURGICOOL

## (undated) DEVICE — SURGICEL FIBRILLAR 1 X 2

## (undated) DEVICE — POSITIONER HANA TABLE PACK

## (undated) DEVICE — CAPIT HIP COP -CMNT/POR-ACTIS

## (undated) DEVICE — SUT SILK 2-0 SH 30 IN K833H

## (undated) DEVICE — EXOFIN PRECISION PEN HIGH VISCOSITY TOPICAL SKIN ADHESIVE: Brand: EXOFIN PRECISION PEN, 1G

## (undated) DEVICE — SUT STRATFIX SPIRAL PDS PLUS 1 CT-1/CT-1 12IN SXPP2B403

## (undated) DEVICE — GLOVE SRG LF STRL BGL SKNSNS 7.5 PF

## (undated) DEVICE — C-ARM: Brand: UNBRANDED

## (undated) DEVICE — GLOVE SRG BIOGEL 8

## (undated) DEVICE — GLOVE SRG BIOGEL 8.5

## (undated) DEVICE — INSTRUMENT POUCH: Brand: CONVERTORS

## (undated) DEVICE — BAG WOUND LAVAGE 1L BIASURGE ADV

## (undated) DEVICE — GLOVE INDICATOR PI UNDERGLOVE SZ 7.5 BLUE

## (undated) DEVICE — GLOVE INDICATOR PI UNDERGLOVE SZ 8.5 BLUE

## (undated) DEVICE — WIPES BABY PAMPERS SENSITIVE 36/PK

## (undated) DEVICE — HANDPIECE SET WITH HIGH FLOW TIP AND SUCTION TUBE: Brand: INTERPULSE

## (undated) DEVICE — STERILE DOUBLE BASIN SET PACK: Brand: CARDINAL HEALTH

## (undated) DEVICE — DRESSING MEPILEX AG BORDER POST-OP 4 X 8 IN

## (undated) DEVICE — FOOT SWITCH DRAPE: Brand: UNBRANDED

## (undated) DEVICE — DRAPE SHEET X-LG

## (undated) DEVICE — TOWEL SET X-RAY

## (undated) DEVICE — WEBRIL 6 IN UNSTERILE

## (undated) DEVICE — SKIN MARKER DUAL TIP WITH RULER CAP, FLEXIBLE RULER AND LABELS: Brand: DEVON

## (undated) DEVICE — ASTOUND SURGICAL GOWN, XXX LARGE, X-LONG: Brand: CONVERTORS

## (undated) DEVICE — SUT VICRYL 0 CP-1 27 IN J267H

## (undated) DEVICE — SUT STRATAFIX SPIRAL 3-0 PGA/PCL 30 X 30 CM SXMD2B410

## (undated) DEVICE — INTENDED FOR TISSUE SEPARATION, AND OTHER PROCEDURES THAT REQUIRE A SHARP SURGICAL BLADE TO PUNCTURE OR CUT.: Brand: BARD-PARKER ® CARBON RIB-BACK BLADES

## (undated) DEVICE — OSCILLATING TIP SAW CARTRIDGE: Brand: PRECISION FALCON

## (undated) DEVICE — MAT ABSORBANT ARTHROSCOPY FLOOR 46 X 40 IN